# Patient Record
Sex: FEMALE | Race: WHITE | NOT HISPANIC OR LATINO | Employment: PART TIME | URBAN - METROPOLITAN AREA
[De-identification: names, ages, dates, MRNs, and addresses within clinical notes are randomized per-mention and may not be internally consistent; named-entity substitution may affect disease eponyms.]

---

## 2017-01-03 ENCOUNTER — OFFICE VISIT - HEALTHEAST (OUTPATIENT)
Dept: FAMILY MEDICINE | Facility: CLINIC | Age: 57
End: 2017-01-03

## 2017-01-03 DIAGNOSIS — H61.23 IMPACTED CERUMEN, BILATERAL: ICD-10-CM

## 2017-01-03 ASSESSMENT — MIFFLIN-ST. JEOR: SCORE: 1342.46

## 2017-02-26 ENCOUNTER — COMMUNICATION - HEALTHEAST (OUTPATIENT)
Dept: FAMILY MEDICINE | Facility: CLINIC | Age: 57
End: 2017-02-26

## 2017-02-26 DIAGNOSIS — F41.9 ANXIETY: ICD-10-CM

## 2017-03-20 ENCOUNTER — COMMUNICATION - HEALTHEAST (OUTPATIENT)
Dept: FAMILY MEDICINE | Facility: CLINIC | Age: 57
End: 2017-03-20

## 2017-05-27 ENCOUNTER — COMMUNICATION - HEALTHEAST (OUTPATIENT)
Dept: FAMILY MEDICINE | Facility: CLINIC | Age: 57
End: 2017-05-27

## 2017-05-27 DIAGNOSIS — F41.9 ANXIETY: ICD-10-CM

## 2017-06-28 ENCOUNTER — OFFICE VISIT - HEALTHEAST (OUTPATIENT)
Dept: FAMILY MEDICINE | Facility: CLINIC | Age: 57
End: 2017-06-28

## 2017-06-28 DIAGNOSIS — R05.9 COUGH: ICD-10-CM

## 2017-06-28 DIAGNOSIS — R50.9 FEVER: ICD-10-CM

## 2017-06-28 DIAGNOSIS — J18.9 COMMUNITY ACQUIRED PNEUMONIA: ICD-10-CM

## 2017-06-28 ASSESSMENT — MIFFLIN-ST. JEOR: SCORE: 1346.99

## 2017-08-27 ENCOUNTER — COMMUNICATION - HEALTHEAST (OUTPATIENT)
Dept: FAMILY MEDICINE | Facility: CLINIC | Age: 57
End: 2017-08-27

## 2017-08-27 DIAGNOSIS — F41.9 ANXIETY: ICD-10-CM

## 2017-11-20 ENCOUNTER — COMMUNICATION - HEALTHEAST (OUTPATIENT)
Dept: FAMILY MEDICINE | Facility: CLINIC | Age: 57
End: 2017-11-20

## 2017-11-20 DIAGNOSIS — F41.9 ANXIETY: ICD-10-CM

## 2017-11-22 ENCOUNTER — AMBULATORY - HEALTHEAST (OUTPATIENT)
Dept: FAMILY MEDICINE | Facility: CLINIC | Age: 57
End: 2017-11-22

## 2018-02-05 ENCOUNTER — OFFICE VISIT - HEALTHEAST (OUTPATIENT)
Dept: FAMILY MEDICINE | Facility: CLINIC | Age: 58
End: 2018-02-05

## 2018-02-05 DIAGNOSIS — Z12.31 ENCOUNTER FOR SCREENING MAMMOGRAM FOR BREAST CANCER: ICD-10-CM

## 2018-02-05 DIAGNOSIS — F41.9 ANXIETY: ICD-10-CM

## 2018-02-05 DIAGNOSIS — Z12.39 SCREENING BREAST EXAMINATION: ICD-10-CM

## 2018-02-05 DIAGNOSIS — Z12.31 VISIT FOR SCREENING MAMMOGRAM: ICD-10-CM

## 2018-02-05 DIAGNOSIS — Z00.00 HEALTHCARE MAINTENANCE: ICD-10-CM

## 2018-02-05 LAB
ALBUMIN SERPL-MCNC: 3.6 G/DL (ref 3.5–5)
ALP SERPL-CCNC: 68 U/L (ref 45–120)
ALT SERPL W P-5'-P-CCNC: 33 U/L (ref 0–45)
ANION GAP SERPL CALCULATED.3IONS-SCNC: 9 MMOL/L (ref 5–18)
AST SERPL W P-5'-P-CCNC: 23 U/L (ref 0–40)
BILIRUB SERPL-MCNC: 0.3 MG/DL (ref 0–1)
BUN SERPL-MCNC: 19 MG/DL (ref 8–22)
CALCIUM SERPL-MCNC: 9.1 MG/DL (ref 8.5–10.5)
CHLORIDE BLD-SCNC: 107 MMOL/L (ref 98–107)
CHOLEST SERPL-MCNC: 257 MG/DL
CO2 SERPL-SCNC: 25 MMOL/L (ref 22–31)
CREAT SERPL-MCNC: 0.66 MG/DL (ref 0.6–1.1)
FASTING STATUS PATIENT QL REPORTED: YES
GFR SERPL CREATININE-BSD FRML MDRD: >60 ML/MIN/1.73M2
GLUCOSE BLD-MCNC: 100 MG/DL (ref 70–125)
HDLC SERPL-MCNC: 50 MG/DL
LDLC SERPL CALC-MCNC: 155 MG/DL
POTASSIUM BLD-SCNC: 4.4 MMOL/L (ref 3.5–5)
PROT SERPL-MCNC: 6.8 G/DL (ref 6–8)
SODIUM SERPL-SCNC: 141 MMOL/L (ref 136–145)
TRIGL SERPL-MCNC: 262 MG/DL
TSH SERPL DL<=0.005 MIU/L-ACNC: 2.11 UIU/ML (ref 0.3–5)

## 2018-02-05 ASSESSMENT — MIFFLIN-ST. JEOR: SCORE: 1346.99

## 2018-02-06 ENCOUNTER — COMMUNICATION - HEALTHEAST (OUTPATIENT)
Dept: FAMILY MEDICINE | Facility: CLINIC | Age: 58
End: 2018-02-06

## 2018-02-15 ENCOUNTER — HOSPITAL ENCOUNTER (OUTPATIENT)
Dept: MAMMOGRAPHY | Facility: HOSPITAL | Age: 58
Discharge: HOME | End: 2018-02-15
Attending: FAMILY MEDICINE

## 2018-02-15 DIAGNOSIS — Z00.00 HEALTHCARE MAINTENANCE: ICD-10-CM

## 2018-02-15 DIAGNOSIS — Z12.31 VISIT FOR SCREENING MAMMOGRAM: ICD-10-CM

## 2018-02-15 DIAGNOSIS — Z12.39 SCREENING BREAST EXAMINATION: ICD-10-CM

## 2018-02-15 DIAGNOSIS — Z12.31 ENCOUNTER FOR SCREENING MAMMOGRAM FOR BREAST CANCER: ICD-10-CM

## 2018-05-07 ENCOUNTER — OFFICE VISIT - HEALTHEAST (OUTPATIENT)
Dept: FAMILY MEDICINE | Facility: CLINIC | Age: 58
End: 2018-05-07

## 2018-05-07 DIAGNOSIS — B07.8 OTHER VIRAL WARTS: ICD-10-CM

## 2018-05-07 ASSESSMENT — MIFFLIN-ST. JEOR: SCORE: 1351.53

## 2018-06-21 ENCOUNTER — COMMUNICATION - HEALTHEAST (OUTPATIENT)
Dept: FAMILY MEDICINE | Facility: CLINIC | Age: 58
End: 2018-06-21

## 2018-06-25 ENCOUNTER — COMMUNICATION - HEALTHEAST (OUTPATIENT)
Dept: FAMILY MEDICINE | Facility: CLINIC | Age: 58
End: 2018-06-25

## 2018-12-28 ENCOUNTER — COMMUNICATION - HEALTHEAST (OUTPATIENT)
Dept: FAMILY MEDICINE | Facility: CLINIC | Age: 58
End: 2018-12-28

## 2019-01-07 ENCOUNTER — COMMUNICATION - HEALTHEAST (OUTPATIENT)
Dept: FAMILY MEDICINE | Facility: CLINIC | Age: 59
End: 2019-01-07

## 2019-02-11 ENCOUNTER — OFFICE VISIT - HEALTHEAST (OUTPATIENT)
Dept: FAMILY MEDICINE | Facility: CLINIC | Age: 59
End: 2019-02-11

## 2019-02-11 DIAGNOSIS — Z11.59 ENCOUNTER FOR HEPATITIS C SCREENING TEST FOR LOW RISK PATIENT: ICD-10-CM

## 2019-02-11 DIAGNOSIS — Z12.31 ENCOUNTER FOR SCREENING MAMMOGRAM FOR BREAST CANCER: ICD-10-CM

## 2019-02-11 DIAGNOSIS — Z00.00 ROUTINE GENERAL MEDICAL EXAMINATION AT A HEALTH CARE FACILITY: ICD-10-CM

## 2019-02-11 DIAGNOSIS — F41.1 ANXIETY STATE: ICD-10-CM

## 2019-02-11 DIAGNOSIS — E78.5 DYSLIPIDEMIA: ICD-10-CM

## 2019-02-11 LAB
ALBUMIN SERPL-MCNC: 4 G/DL (ref 3.5–5)
ALP SERPL-CCNC: 71 U/L (ref 45–120)
ALT SERPL W P-5'-P-CCNC: 35 U/L (ref 0–45)
ANION GAP SERPL CALCULATED.3IONS-SCNC: 9 MMOL/L (ref 5–18)
AST SERPL W P-5'-P-CCNC: 23 U/L (ref 0–40)
BILIRUB SERPL-MCNC: 0.4 MG/DL (ref 0–1)
BUN SERPL-MCNC: 18 MG/DL (ref 8–22)
CALCIUM SERPL-MCNC: 10 MG/DL (ref 8.5–10.5)
CHLORIDE BLD-SCNC: 107 MMOL/L (ref 98–107)
CHOLEST SERPL-MCNC: 318 MG/DL
CO2 SERPL-SCNC: 26 MMOL/L (ref 22–31)
CREAT SERPL-MCNC: 0.72 MG/DL (ref 0.6–1.1)
FASTING STATUS PATIENT QL REPORTED: YES
GFR SERPL CREATININE-BSD FRML MDRD: >60 ML/MIN/1.73M2
GLUCOSE BLD-MCNC: 98 MG/DL (ref 70–125)
HDLC SERPL-MCNC: 58 MG/DL
LDLC SERPL CALC-MCNC: 209 MG/DL
POTASSIUM BLD-SCNC: 4.3 MMOL/L (ref 3.5–5)
PROT SERPL-MCNC: 7.4 G/DL (ref 6–8)
SODIUM SERPL-SCNC: 142 MMOL/L (ref 136–145)
TRIGL SERPL-MCNC: 257 MG/DL

## 2019-02-11 ASSESSMENT — MIFFLIN-ST. JEOR: SCORE: 1371.94

## 2019-02-12 ENCOUNTER — COMMUNICATION - HEALTHEAST (OUTPATIENT)
Dept: FAMILY MEDICINE | Facility: CLINIC | Age: 59
End: 2019-02-12

## 2019-02-12 LAB — HCV AB SERPL QL IA: NEGATIVE

## 2019-02-14 ENCOUNTER — COMMUNICATION - HEALTHEAST (OUTPATIENT)
Dept: FAMILY MEDICINE | Facility: CLINIC | Age: 59
End: 2019-02-14

## 2019-02-14 DIAGNOSIS — F41.9 ANXIETY: ICD-10-CM

## 2019-04-25 ENCOUNTER — HOSPITAL ENCOUNTER (OUTPATIENT)
Dept: MAMMOGRAPHY | Facility: CLINIC | Age: 59
Discharge: HOME | End: 2019-04-25
Attending: FAMILY MEDICINE

## 2019-04-25 DIAGNOSIS — Z12.31 ENCOUNTER FOR SCREENING MAMMOGRAM FOR BREAST CANCER: ICD-10-CM

## 2019-05-20 ENCOUNTER — HOSPITAL ENCOUNTER (OUTPATIENT)
Dept: MAMMOGRAPHY | Facility: CLINIC | Age: 59
Discharge: HOME | End: 2019-05-20
Attending: FAMILY MEDICINE

## 2019-05-20 DIAGNOSIS — R92.0 MICROCALCIFICATION OF RIGHT BREAST ON MAMMOGRAM: ICD-10-CM

## 2019-05-20 DIAGNOSIS — N64.89 BREAST ASYMMETRY: ICD-10-CM

## 2019-05-28 ENCOUNTER — COMMUNICATION - HEALTHEAST (OUTPATIENT)
Dept: FAMILY MEDICINE | Facility: CLINIC | Age: 59
End: 2019-05-28

## 2019-05-28 DIAGNOSIS — F41.1 ANXIETY STATE: ICD-10-CM

## 2019-05-29 ENCOUNTER — COMMUNICATION - HEALTHEAST (OUTPATIENT)
Dept: SCHEDULING | Facility: CLINIC | Age: 59
End: 2019-05-29

## 2019-05-29 DIAGNOSIS — F41.1 ANXIETY STATE: ICD-10-CM

## 2019-12-16 ENCOUNTER — COMMUNICATION - HEALTHEAST (OUTPATIENT)
Dept: SCHEDULING | Facility: CLINIC | Age: 59
End: 2019-12-16

## 2019-12-16 DIAGNOSIS — F41.1 ANXIETY STATE: ICD-10-CM

## 2020-02-01 ENCOUNTER — COMMUNICATION - HEALTHEAST (OUTPATIENT)
Dept: FAMILY MEDICINE | Facility: CLINIC | Age: 60
End: 2020-02-01

## 2020-02-01 DIAGNOSIS — F41.9 ANXIETY: ICD-10-CM

## 2020-02-10 ENCOUNTER — COMMUNICATION - HEALTHEAST (OUTPATIENT)
Dept: SCHEDULING | Facility: CLINIC | Age: 60
End: 2020-02-10

## 2020-02-10 DIAGNOSIS — F41.1 ANXIETY STATE: ICD-10-CM

## 2020-02-17 ENCOUNTER — OFFICE VISIT - HEALTHEAST (OUTPATIENT)
Dept: FAMILY MEDICINE | Facility: CLINIC | Age: 60
End: 2020-02-17

## 2020-02-17 DIAGNOSIS — Z00.00 HEALTHCARE MAINTENANCE: ICD-10-CM

## 2020-02-17 DIAGNOSIS — F41.9 ANXIETY: ICD-10-CM

## 2020-02-17 DIAGNOSIS — R92.1 BREAST CALCIFICATIONS: ICD-10-CM

## 2020-02-17 DIAGNOSIS — Z12.11 SCREEN FOR COLON CANCER: ICD-10-CM

## 2020-02-17 DIAGNOSIS — F41.1 ANXIETY STATE: ICD-10-CM

## 2020-02-17 DIAGNOSIS — Z11.4 ENCOUNTER FOR SCREENING FOR HIV: ICD-10-CM

## 2020-02-17 DIAGNOSIS — B00.1 COLD SORE: ICD-10-CM

## 2020-02-17 DIAGNOSIS — E78.5 DYSLIPIDEMIA: ICD-10-CM

## 2020-02-17 LAB
ALBUMIN SERPL-MCNC: 4.1 G/DL (ref 3.5–5)
ALP SERPL-CCNC: 71 U/L (ref 45–120)
ALT SERPL W P-5'-P-CCNC: 28 U/L (ref 0–45)
ANION GAP SERPL CALCULATED.3IONS-SCNC: 10 MMOL/L (ref 5–18)
AST SERPL W P-5'-P-CCNC: 20 U/L (ref 0–40)
BILIRUB SERPL-MCNC: 0.4 MG/DL (ref 0–1)
BUN SERPL-MCNC: 14 MG/DL (ref 8–22)
CALCIUM SERPL-MCNC: 10.1 MG/DL (ref 8.5–10.5)
CHLORIDE BLD-SCNC: 105 MMOL/L (ref 98–107)
CHOLEST SERPL-MCNC: 322 MG/DL
CO2 SERPL-SCNC: 25 MMOL/L (ref 22–31)
CREAT SERPL-MCNC: 0.75 MG/DL (ref 0.6–1.1)
FASTING STATUS PATIENT QL REPORTED: YES
GFR SERPL CREATININE-BSD FRML MDRD: >60 ML/MIN/1.73M2
GLUCOSE BLD-MCNC: 97 MG/DL (ref 70–125)
HDLC SERPL-MCNC: 53 MG/DL
HIV 1+2 AB+HIV1 P24 AG SERPL QL IA: NEGATIVE
LDLC SERPL CALC-MCNC: 195 MG/DL
POTASSIUM BLD-SCNC: 4.2 MMOL/L (ref 3.5–5)
PROT SERPL-MCNC: 7.2 G/DL (ref 6–8)
SODIUM SERPL-SCNC: 140 MMOL/L (ref 136–145)
TRIGL SERPL-MCNC: 370 MG/DL

## 2020-02-17 ASSESSMENT — MIFFLIN-ST. JEOR: SCORE: 1363.43

## 2020-02-18 ENCOUNTER — COMMUNICATION - HEALTHEAST (OUTPATIENT)
Dept: FAMILY MEDICINE | Facility: CLINIC | Age: 60
End: 2020-02-18

## 2020-02-18 ENCOUNTER — AMBULATORY - HEALTHEAST (OUTPATIENT)
Dept: FAMILY MEDICINE | Facility: CLINIC | Age: 60
End: 2020-02-18

## 2020-02-18 DIAGNOSIS — E78.5 DYSLIPIDEMIA: ICD-10-CM

## 2020-02-20 ENCOUNTER — COMMUNICATION - HEALTHEAST (OUTPATIENT)
Dept: FAMILY MEDICINE | Facility: CLINIC | Age: 60
End: 2020-02-20

## 2020-02-20 DIAGNOSIS — B00.1 HERPES LABIALIS: ICD-10-CM

## 2020-02-20 RX ORDER — PENCICLOVIR 10 MG/G
CREAM TOPICAL
Qty: 1.5 G | Refills: 3 | Status: SHIPPED | OUTPATIENT
Start: 2020-02-20 | End: 2022-03-21

## 2020-02-24 ENCOUNTER — COMMUNICATION - HEALTHEAST (OUTPATIENT)
Dept: FAMILY MEDICINE | Facility: CLINIC | Age: 60
End: 2020-02-24

## 2020-02-28 ENCOUNTER — HOSPITAL ENCOUNTER (OUTPATIENT)
Dept: MAMMOGRAPHY | Facility: CLINIC | Age: 60
Discharge: HOME | End: 2020-02-28
Attending: FAMILY MEDICINE

## 2020-02-28 DIAGNOSIS — R92.1 BREAST CALCIFICATIONS: ICD-10-CM

## 2020-11-18 ENCOUNTER — COMMUNICATION - HEALTHEAST (OUTPATIENT)
Dept: FAMILY MEDICINE | Facility: CLINIC | Age: 60
End: 2020-11-18

## 2020-11-18 DIAGNOSIS — F41.1 ANXIETY STATE: ICD-10-CM

## 2020-12-12 ENCOUNTER — RECORDS - HEALTHEAST (OUTPATIENT)
Dept: ADMINISTRATIVE | Facility: OTHER | Age: 60
End: 2020-12-12

## 2021-02-16 ENCOUNTER — COMMUNICATION - HEALTHEAST (OUTPATIENT)
Dept: FAMILY MEDICINE | Facility: CLINIC | Age: 61
End: 2021-02-16

## 2021-02-16 DIAGNOSIS — F41.9 ANXIETY: ICD-10-CM

## 2021-03-15 ENCOUNTER — COMMUNICATION - HEALTHEAST (OUTPATIENT)
Dept: TELEHEALTH | Facility: CLINIC | Age: 61
End: 2021-03-15

## 2021-03-15 ENCOUNTER — OFFICE VISIT - HEALTHEAST (OUTPATIENT)
Dept: FAMILY MEDICINE | Facility: CLINIC | Age: 61
End: 2021-03-15

## 2021-03-15 DIAGNOSIS — E78.5 DYSLIPIDEMIA: ICD-10-CM

## 2021-03-15 DIAGNOSIS — Z12.4 SCREENING FOR MALIGNANT NEOPLASM OF CERVIX: ICD-10-CM

## 2021-03-15 DIAGNOSIS — F41.1 ANXIETY STATE: ICD-10-CM

## 2021-03-15 DIAGNOSIS — Z00.00 HEALTHCARE MAINTENANCE: ICD-10-CM

## 2021-03-15 LAB
ALBUMIN SERPL-MCNC: 4.1 G/DL (ref 3.5–5)
ALP SERPL-CCNC: 72 U/L (ref 45–120)
ALT SERPL W P-5'-P-CCNC: 22 U/L (ref 0–45)
ANION GAP SERPL CALCULATED.3IONS-SCNC: 10 MMOL/L (ref 5–18)
AST SERPL W P-5'-P-CCNC: 18 U/L (ref 0–40)
BILIRUB SERPL-MCNC: 0.3 MG/DL (ref 0–1)
BUN SERPL-MCNC: 16 MG/DL (ref 8–22)
CALCIUM SERPL-MCNC: 9.4 MG/DL (ref 8.5–10.5)
CHLORIDE BLD-SCNC: 106 MMOL/L (ref 98–107)
CHOLEST SERPL-MCNC: 309 MG/DL
CO2 SERPL-SCNC: 26 MMOL/L (ref 22–31)
CREAT SERPL-MCNC: 0.73 MG/DL (ref 0.6–1.1)
FASTING STATUS PATIENT QL REPORTED: YES
GFR SERPL CREATININE-BSD FRML MDRD: >60 ML/MIN/1.73M2
GLUCOSE BLD-MCNC: 92 MG/DL (ref 70–125)
HDLC SERPL-MCNC: 48 MG/DL
LDLC SERPL CALC-MCNC: 205 MG/DL
POTASSIUM BLD-SCNC: 4.5 MMOL/L (ref 3.5–5)
PROT SERPL-MCNC: 7.1 G/DL (ref 6–8)
SODIUM SERPL-SCNC: 142 MMOL/L (ref 136–145)
TRIGL SERPL-MCNC: 278 MG/DL

## 2021-03-15 RX ORDER — GABAPENTIN 100 MG/1
100 CAPSULE ORAL 3 TIMES DAILY PRN
Qty: 30 CAPSULE | Refills: 0 | Status: SHIPPED | OUTPATIENT
Start: 2021-03-15 | End: 2022-03-21

## 2021-03-15 RX ORDER — ALPRAZOLAM 0.25 MG
TABLET ORAL
Qty: 30 TABLET | Refills: 0 | Status: SHIPPED | OUTPATIENT
Start: 2021-03-15 | End: 2021-12-20

## 2021-03-15 ASSESSMENT — MIFFLIN-ST. JEOR: SCORE: 1360.03

## 2021-03-16 ENCOUNTER — COMMUNICATION - HEALTHEAST (OUTPATIENT)
Dept: FAMILY MEDICINE | Facility: CLINIC | Age: 61
End: 2021-03-16

## 2021-03-16 LAB
HPV SOURCE: NORMAL
HUMAN PAPILLOMA VIRUS 16 DNA: NEGATIVE
HUMAN PAPILLOMA VIRUS 18 DNA: NEGATIVE
HUMAN PAPILLOMA VIRUS FINAL DIAGNOSIS: NORMAL
HUMAN PAPILLOMA VIRUS OTHER HR: NEGATIVE
SPECIMEN DESCRIPTION: NORMAL

## 2021-03-22 LAB
BKR LAB AP ABNORMAL BLEEDING: NO
BKR LAB AP BIRTH CONTROL/HORMONES: NORMAL
BKR LAB AP CERVICAL APPEARANCE: NORMAL
BKR LAB AP GYN ADEQUACY: NORMAL
BKR LAB AP GYN INTERPRETATION: NORMAL
BKR LAB AP HPV REFLEX: NORMAL
BKR LAB AP LMP: NORMAL
BKR LAB AP PATIENT STATUS: NORMAL
BKR LAB AP PREVIOUS ABNORMAL: NORMAL
BKR LAB AP PREVIOUS NORMAL: 2016
HIGH RISK?: NO
PATH REPORT.COMMENTS IMP SPEC: NORMAL
RESULT FLAG (HE HISTORICAL CONVERSION): NORMAL

## 2021-03-23 ENCOUNTER — COMMUNICATION - HEALTHEAST (OUTPATIENT)
Dept: FAMILY MEDICINE | Facility: CLINIC | Age: 61
End: 2021-03-23

## 2021-04-28 ENCOUNTER — COMMUNICATION - HEALTHEAST (OUTPATIENT)
Dept: FAMILY MEDICINE | Facility: CLINIC | Age: 61
End: 2021-04-28

## 2021-04-28 DIAGNOSIS — F41.9 ANXIETY: ICD-10-CM

## 2021-04-30 RX ORDER — PAROXETINE 10 MG/1
TABLET, FILM COATED ORAL
Qty: 90 TABLET | Refills: 3 | Status: SHIPPED | OUTPATIENT
Start: 2021-04-30 | End: 2022-01-24

## 2021-05-11 ENCOUNTER — COMMUNICATION - HEALTHEAST (OUTPATIENT)
Dept: FAMILY MEDICINE | Facility: CLINIC | Age: 61
End: 2021-05-11

## 2021-05-24 ENCOUNTER — RECORDS - HEALTHEAST (OUTPATIENT)
Dept: ADMINISTRATIVE | Facility: CLINIC | Age: 61
End: 2021-05-24

## 2021-05-25 ENCOUNTER — RECORDS - HEALTHEAST (OUTPATIENT)
Dept: ADMINISTRATIVE | Facility: CLINIC | Age: 61
End: 2021-05-25

## 2021-05-29 NOTE — TELEPHONE ENCOUNTER
Reason contacted:  Medication problem  Information relayed:  Below message   Additional questions:  No  Further follow-up needed:  No  Okay to leave a detailed message:  No

## 2021-05-29 NOTE — TELEPHONE ENCOUNTER
Controlled Substance Refill Request  Medication:   Requested Prescriptions     Pending Prescriptions Disp Refills     ALPRAZolam (XANAX) 0.25 MG tablet [Pharmacy Med Name: ALPRAZOLAM 0.25MG TABLETS] 30 tablet 0     Sig: TAKE 1 TABLET BY MOUTH EVERY 8 HOURS AS NEEDED     Date Last Fill: 11/22/17  Pharmacy: [id number]   Submit electronically to pharmacy  Controlled Substance Agreement on File:   Encounter-Level CSA Scan Date:    There are no encounter-level csa scan date.       Last office visit: Last office visit pertaining to requested medication was 2/11/19.

## 2021-05-29 NOTE — TELEPHONE ENCOUNTER
Who is calling:  Patient  Reason for Call:  States she is out of medication.  Please review.   Date of last appointment with primary care: NA  Okay to leave a detailed message: No

## 2021-05-29 NOTE — TELEPHONE ENCOUNTER
Patient called and states that she went to go  her prescription for alprazolam and it was only filled for #10 tablets by Dr. Curtis. Patient is confused by this as Dr. Zimmerman usually prescribes it for #30.     Patient would like Dr. Zimmerman to review and send in rest of prescription.     Reason for Disposition    Caller has NON-URGENT medication question about med that PCP prescribed and triager unable to answer question    Protocols used: MEDICATION QUESTION CALL-A-AH

## 2021-05-30 ENCOUNTER — RECORDS - HEALTHEAST (OUTPATIENT)
Dept: ADMINISTRATIVE | Facility: CLINIC | Age: 61
End: 2021-05-30

## 2021-05-30 VITALS — BODY MASS INDEX: 28.16 KG/M2 | HEIGHT: 65 IN | WEIGHT: 169 LBS

## 2021-05-31 VITALS — HEIGHT: 65 IN | BODY MASS INDEX: 28.32 KG/M2 | WEIGHT: 170 LBS

## 2021-06-01 VITALS — HEIGHT: 65 IN | WEIGHT: 171 LBS | BODY MASS INDEX: 28.49 KG/M2

## 2021-06-01 VITALS — WEIGHT: 170 LBS | HEIGHT: 65 IN | BODY MASS INDEX: 28.32 KG/M2

## 2021-06-02 VITALS — BODY MASS INDEX: 27.64 KG/M2 | WEIGHT: 172 LBS | HEIGHT: 66 IN

## 2021-06-04 VITALS
HEART RATE: 83 BPM | BODY MASS INDEX: 27.48 KG/M2 | WEIGHT: 171 LBS | HEIGHT: 66 IN | SYSTOLIC BLOOD PRESSURE: 110 MMHG | DIASTOLIC BLOOD PRESSURE: 72 MMHG | OXYGEN SATURATION: 96 %

## 2021-06-04 NOTE — TELEPHONE ENCOUNTER
Controlled Substance Refill Request  Medication:   Requested Prescriptions     Pending Prescriptions Disp Refills     ALPRAZolam (XANAX) 0.25 MG tablet [Pharmacy Med Name: ALPRAZOLAM 0.25MG TABLETS] 30 tablet 0     Sig: TAKE 1 TABLET BY MOUTH EVERY 8 HOURS AS NEEDED     Date Last Fill: 5/30/19  Pharmacy: [id number]   Submit electronically to pharmacy  Controlled Substance Agreement on File:   Encounter-Level CSA Scan Date:    There are no encounter-level csa scan date.       Last office visit: Last office visit pertaining to requested medication was 2/11/19.

## 2021-06-05 VITALS
WEIGHT: 172 LBS | DIASTOLIC BLOOD PRESSURE: 70 MMHG | HEART RATE: 76 BPM | HEIGHT: 65 IN | BODY MASS INDEX: 28.66 KG/M2 | SYSTOLIC BLOOD PRESSURE: 100 MMHG | OXYGEN SATURATION: 97 %

## 2021-06-05 NOTE — TELEPHONE ENCOUNTER
Refill Approved    Rx renewed per Medication Renewal Policy. Medication was last renewed on 2/17/19    Enedina Miller, [facility] Connection Triage/Med Refill 2/2/2020     Requested Prescriptions   Pending Prescriptions Disp Refills     PARoxetine (PAXIL) 10 MG tablet [Pharmacy Med Name: PAROXETINE 10MG TABLETS] 90 tablet 3     Sig: TAKE 1 TABLET(10 MG) BY MOUTH EVERY MORNING       SSRI Refill Protocol  Passed - 2/1/2020  8:30 PM        Passed - PCP or prescribing provider visit in last year     Last office visit with prescriber/PCP: 5/7/2018 Tianna Zimmerman MD OR same dept: Visit date not found OR same specialty: 5/7/2018 Tianna Zimmerman MD  Last physical: 2/11/2019 Last MTM visit: Visit date not found   Next visit within 3 mo: Visit date not found  Next physical within 3 mo: Visit date not found  Prescriber OR PCP: Tianna Zimmerman MD  Last diagnosis associated with med order: 1. Anxiety  - PARoxetine (PAXIL) 10 MG tablet [Pharmacy Med Name: PAROXETINE 10MG TABLETS]; TAKE 1 TABLET(10 MG) BY MOUTH EVERY MORNING  Dispense: 90 tablet; Refill: 3    If protocol passes may refill for 12 months if within 3 months of last provider visit (or a total of 15 months).

## 2021-06-06 NOTE — TELEPHONE ENCOUNTER
Controlled Substance Refill Request  Medication Name:   Requested Prescriptions     Pending Prescriptions Disp Refills     ALPRAZolam (XANAX) 0.25 MG tablet [Pharmacy Med Name: ALPRAZOLAM 0.25MG TABLETS] 30 tablet 0     Sig: TAKE 1 TABLET BY MOUTH EVERY 8 HOURS AS NEEDED     Date Last Fill:   ALPRAZolam (XANAX) 0.25 MG tablet 30 tablet 0 12/17/2019  No   Sig: TAKE 1 TABLET BY MOUTH EVERY 8 HOURS AS NEEDED   Sent to pharmacy as: ALPRAZolam 0.25 mg tablet (XANAX)   E-Prescribing Status: Receipt confirmed by pharmacy (12/17/2019  9:03 AM CST)   [facility]ed [facility]  Submit electronically to pharmacy  Controlled Substance Agreement on file:   Encounter-Level CSA Scan Date:    There are no encounter-level csa scan date.        Last office visit:  2/11/19

## 2021-06-06 NOTE — TELEPHONE ENCOUNTER
----- Message from Tianna Zimmerman MD sent at 2/18/2020  7:09 AM CST -----  Your cholesterol is so high it is important we start a statin medication.  I am happy to discuss this with you if you would like a telephone call but this is a medication that you take once nightly in order to lower your overall cholesterol. It has been called in for you.

## 2021-06-06 NOTE — TELEPHONE ENCOUNTER
Called patient to discuss the statin. Left message.    The ASCVD Risk score (Lowellrashmi NEGRON Jr., et al., 2013) failed to calculate for the following reasons:    The valid total cholesterol range is 130 to 320 mg/dL

## 2021-06-06 NOTE — TELEPHONE ENCOUNTER
Medication Request    Medication name: Denavir    Requested Pharmacy: [id number] [address] AT [place] [address]     Reason for request: Herpes    When did you use medication last?:  NA    Patient offered appointment:  patient declined     Okay to leave a detailed message: yes    Please send script to patients pharmacy and inform the patient of the outcome to this request.

## 2021-06-06 NOTE — TELEPHONE ENCOUNTER
Question following Office Visit  When did you see your provider: 2/17/20  What is your question: Patient stated she wanted Tianna Zimmerman MD to call her back to discuss with her more on the red yeast rice VS a statin medication.       FYI:  Patient stated to let Tianna Zimmerman MD know that the Denovir worked great for her cold sore and her cold sore is almost gone.    Patient also stated no one has called her to set up the mammogram yet. Patient was given imaging scheduling phone number and was transferred to them as well.  Okay to leave a detailed message: No [phone]

## 2021-06-06 NOTE — TELEPHONE ENCOUNTER
Returned patient call.     Patient is willing to trial atorvastatin but also picked up red rice yeast. She might consider it in the future.

## 2021-06-06 NOTE — TELEPHONE ENCOUNTER
Patient Returning Call    Reason for call:  tcb    Information relayed to patient:      Patient informed of lab findings and the need to start med's per PCP.    She states she was on a statin and didn't like the side effects.    States her daughter does research and statin are just deteriorates the bones.      Patient has additional questions:  Yes  If YES, what are your questions/concerns:  Treatment options    Okay to leave a detailed message?: Yes    Please call patient to discuss a plan of care.

## 2021-06-06 NOTE — TELEPHONE ENCOUNTER
Pt notified and she states she used to be on Atorvastatin but it was giving her muscle aches, so you said she could d/c it. Can you contact pt to discuss alternative plan?

## 2021-06-06 NOTE — TELEPHONE ENCOUNTER
Please send to the pharmacy if appropriate  I do not see this has been prescribed for her in the past.

## 2021-06-06 NOTE — PROGRESS NOTES
Assessment/Plan:     Patient presents today for routine physical examination.    Healthcare Maintenance: USPSTF recommendations for age 60:  Patient has been counseled on/screened for:  - intimate partner violence and there are no concerns at this time  - a healthful diet and physical activity for CVD disease prevention  - Diabetes and hyperlipidemia: screening was performed.   - Hep C, negative in 2018  - Asprin use: patient chose to start  Sexually transmitted infections: Patient would not like to be screened for chlamydia, gonorrhea, syphilis, HIV, and hepatitis; HIV ok  Colorectal Cancer: Colonoscopy last performed in 2010; ordered today  Immunizations: up to date except for zoster and influenza which was recommended  Cervical Cancer Screening: patient has been screened for cervical cancer per protocol in 2016, results were negative with negative HPV, due in 2021  Breast Cancer: Last mammogram 5/19, but recommended right diagnostic mammogram 6 months after last image      Additional concerns are as detailed below:    1. Breast calcifications  Strongly recommended patient proceed with the diagnostic mammogram previously recommended.   - Mammo Diagnostic Right; Future    2. Anxiety  Refilled:  - PARoxetine (PAXIL) 10 MG tablet; TAKE 1 TABLET(10 MG) BY MOUTH EVERY MORNING  Dispense: 90 tablet; Refill: 3  - recommended hydroxyzine instead of xanax as a first line agent; ok to use xanax sparingly as a 2nd line    3. Cold sore  - valACYclovir (VALTREX) 1000 MG tablet; Take 2 tablets (2,000 mg total) by mouth 2 (two) times a day for 1 day.  Dispense: 4 tablet; Refill: 1      AVS printed and given to patient.  Return to clinic in 1 year.    I have had an Advance Directives discussion with the patient.    This note has been dictated using voice recognition software. Any grammatical or context distortions are unintentional and inherent to the the software.     Tianna Zimmerman MD  [facility]  "Clinic    Subjective:     Monica Camacho is a 60 y.o. female who presents to clinic for routine physical.    Additional concerns include:    1.  Patient has no concerns.  She states her anxiety is well controlled with Paxil.  She takes the Xanax very sparingly.     Diet: well balanced diet  Physical Activity: The patient maintains a regular, healthy exercise program.    PHQ-2 Score:  0    Health Care Directive: not on file but handout provided    Patient Care Team:  PCP: Tianna Zimmerman     Past Medical History, Family History, and Social History reviewed.     Review of systems is as stated in HPI.  Patient endorses: none  The remainder of the 10 system review is otherwise negative.    Objective:     /72   Pulse 83   Ht 5' 5.75\" (1.67 m)   Wt 171 lb (77.6 kg)   LMP 07/15/2015   SpO2 96%   BMI 27.81 kg/m    Gen: Alert, NAD, appears stated age, normal hygiene   Eyes: conjunctivae without injection, sclera clear, EOMI  ENT/mouth: nares clear, septum midline, absent rhinorrhea,absent pharyngeal injection, neck is supple, no thyroid enlargement  CV: RRR, no murmur appreciated, pedal edema absent bilaterally  Resp: CTAB, no wheezes, rales or ronchi  ABD: normoactive, non-tender to palpation, nondistended  MSK: grossly full range of motion in all joints, no obvious deformity  Neuro: CN II-XII grossly intact, no deficits in coordination  Psych: no apparent hallucinations or delusions, no pressured speech; alert, oriented x3  SKIN: dry and without lesions except for a cold sore on the bottom right lip  Heme/lymph: no pallor, no active bleeding/bruising, no adenopathy appreciated  :  - external genitalia: normal appearance, no lesions, no flattening of the anatomic structures  - urethral meatus: without prolapse, no obvious irritation  - vagina: expected resilience given estrogen status, both cystocele and rectocele present but mild  - cervix: no cervical motion tenderness  - uterus: anteverted  - anus and " perineum: normal and without lesions  Breast:  - absent nipple discharge, no masses appreciated, nontender to palpation      Medications:  Current Outpatient Medications   Medication Sig     ALPRAZolam (XANAX) 0.25 MG tablet TAKE 1 TABLET BY MOUTH EVERY 8 HOURS AS NEEDED     calcium-vitamin D (CALCIUM-VITAMIN D) 500 mg(1,250mg) -200 unit per tablet Take 1 tablet by mouth 2 (two) times a day with meals.     cholecalciferol, vitamin D3, (VITAMIN D3) 2,000 unit cap Take by mouth.     multivitamin capsule Take 1 capsule by mouth daily.     PARoxetine (PAXIL) 10 MG tablet TAKE 1 TABLET(10 MG) BY MOUTH EVERY MORNING     aspirin 81 MG EC tablet Take 1 tablet (81 mg total) by mouth daily.     hydrOXYzine HCl (ATARAX) 25 MG tablet Take 0.5-2 tablets (12.5-50 mg total) by mouth 3 (three) times a day as needed for anxiety.     valACYclovir (VALTREX) 1000 MG tablet Take 2 tablets (2,000 mg total) by mouth 2 (two) times a day for 1 day.       Allergies:  Allergies   Allergen Reactions     Amoxicillin      Penicillins        PMH:  Past Medical History:   Diagnosis Date     Anxiety      Cold sore        PSH:  Past Surgical History:   Procedure Laterality Date     BREAST BIOPSY Right 1990    benign      SECTION  1985       Family Hx:  Family History   Problem Relation Age of Onset     Colon cancer Mother 58     Hyperlipidemia Mother      Hyperlipidemia Father      Hypertension Father      Cancer Father         skin cancer, not melanoma     Breast cancer Other         Mat Cousin     Breast cancer Maternal Grandmother 74     No Medical Problems Paternal Grandmother      No Medical Problems Paternal Grandfather      No Medical Problems Maternal Aunt      No Medical Problems Paternal Aunt      BRCA 1/2 Neg Hx      Endometrial cancer Neg Hx      Ovarian cancer Neg Hx        Social History:  Social History     Socioeconomic History     Marital status:      Spouse name: Not on file     Number of children: Not on file      Years of education: Not on file     Highest education level: Not on file   Occupational History     Not on file   Social Needs     Financial resource strain: Not on file     Food insecurity:     Worry: Not on file     Inability: Not on file     Transportation needs:     Medical: Not on file     Non-medical: Not on file   Tobacco Use     Smoking status: Never Smoker     Smokeless tobacco: Never Used   Substance and Sexual Activity     Alcohol use: Yes     Alcohol/week: 1.0 standard drinks     Types: 1 Glasses of wine per week     Drug use: No     Sexual activity: Yes     Partners: Male     Birth control/protection: Post-menopausal   Lifestyle     Physical activity:     Days per week: Not on file     Minutes per session: Not on file     Stress: Not on file   Relationships     Social connections:     Talks on phone: Not on file     Gets together: Not on file     Attends Yarsanism service: Not on file     Active member of club or organization: Not on file     Attends meetings of clubs or organizations: Not on file     Relationship status: Not on file     Intimate partner violence:     Fear of current or ex partner: Not on file     Emotionally abused: Not on file     Physically abused: Not on file     Forced sexual activity: Not on file   Other Topics Concern     Not on file   Social History Narrative     Not on file       LDL Calculated (mg/dL)   Date Value   02/11/2019 209 (H)   02/05/2018 155 (H)   10/12/2015 175 (H)        Immunization History   Administered Date(s) Administered     Tdap 07/22/2013     There are no preventive care reminders to display for this patient.

## 2021-06-06 NOTE — TELEPHONE ENCOUNTER
Called patient to discuss the importance of statins.  After extensive discussion, patient will trial the atorvastatin.  If she develops leg cramps she may try red rice yeast.  We discussed that no studies exist on red rice yeast efficacy as compared to statins, but patient is still interested in trying.  She will also consider Metamucil daily to help lower her cholesterol.  Also happy to call in the herpes medication.     The ASCVD Risk score (Laurarashmi NEGRON Jr., et al., 2013) failed to calculate for the following reasons:    The valid total cholesterol range is 130 to 320 mg/dL

## 2021-06-08 NOTE — PROGRESS NOTES
Assessment/Plan:     1. Impacted cerumen, bilateral  Removed with lavage and finished with curette.  Patient noticed red resolution of symptoms.  Aftercare discussed.  Call return to care if symptoms worsen or do not improve    Recommend following up with primary care physician for annual visit and maintenance of chronic conditions        Subjective:      Monica Camacho is a 56 y.o. female comes in today for cerumen impaction.  She states that she has noticed it for about a month.  She has had it before.  She states she primarily feels it on the left side but slightly on the right.  It feels as though it is muffled.  No significant pain no recent illnesses.  She otherwise feels okay without any new concerns.  No problems with any of her medications.  Has no other new concerns today    Current Outpatient Prescriptions   Medication Sig Dispense Refill     ALPRAZolam (XANAX) 0.25 MG tablet TAKE 1 TABLET BY MOUTH EVERY 8 HOURS AS NEEDED 60 tablet 0     calcium-vitamin D (CALCIUM-VITAMIN D) 500 mg(1,250mg) -200 unit per tablet Take 1 tablet by mouth 2 (two) times a day with meals.       cholecalciferol, vitamin D3, (VITAMIN D3) 2,000 unit cap Take by mouth.       multivitamin capsule Take 1 capsule by mouth daily.       PARoxetine (PAXIL) 10 MG tablet TAKE 1 TABLET BY MOUTH EVERY DAY 90 tablet 3     ALPRAZolam (XANAX) 0.25 MG tablet TAKE 1 TABLET BY MOUTH EVERY 8 HOURS AS NEEDED 60 tablet 0     No current facility-administered medications for this visit.        Past Medical History, Family History, and Social History reviewed.  Past Medical History   Diagnosis Date     Anxiety      Past Surgical History   Procedure Laterality Date     Breast biopsy Right      benign      section       Amoxicillin and Penicillins  Family History   Problem Relation Age of Onset     Colon cancer Mother 58     Hyperlipidemia Mother      Hyperlipidemia Father      Hypertension Father      Breast cancer Other      Mat Cousin  "    Breast cancer Maternal Grandmother 74     No Medical Problems Paternal Grandmother      No Medical Problems Paternal Grandfather      No Medical Problems Maternal Aunt      No Medical Problems Paternal Aunt      BRCA 1/2 Neg Hx      Cancer Neg Hx      Endometrial cancer Neg Hx      Ovarian cancer Neg Hx      Social History     Social History     Marital status:      Spouse name: N/A     Number of children: N/A     Years of education: N/A     Occupational History     Not on file.     Social History Main Topics     Smoking status: Never Smoker     Smokeless tobacco: Never Used     Alcohol use 3.0 oz/week     5 Glasses of wine per week     Drug use: No     Sexual activity: Yes     Partners: Male     Birth control/ protection: Other     Other Topics Concern     Not on file     Social History Narrative         Review of systems is as stated in HPI, and the remainder of the 10 system review is otherwise negative.    Objective:     Vitals:    01/03/17 1559   BP: 112/74   Patient Site: Right Arm   Patient Position: Sitting   Cuff Size: Adult Regular   Pulse: 76   Weight: 169 lb (76.7 kg)   Height: 5' 5\" (1.651 m)    Body mass index is 28.12 kg/(m^2).    General Appearance:    Alert, cooperative, no distress, appears stated age   Head:    Normocephalic, without obvious abnormality, atraumatic   Eyes:    PERRL, EOM's intact   Ears:   bilateral cerumen impaction removed showing normal TM's and external ear canals   Nose:   Mucosa normal, no drainage     or sinus tenderness   Throat:   Oropharynx is clear   Neck:   Supple, symmetrical, no adenopathy, no thyromegally       Lungs:     Clear to auscultation bilaterally, respirations unlabored        Heart:    Regular rate and rhythm, S1 and S2 normal, no murmur, rub    or gallop                   Extremities:   Extremities normal, atraumatic, no cyanosis or edema       Skin:   No rashes or lesions         This note has been dictated using voice recognition software. Any " grammatical or context distortions are unintentional and inherent to the the software.

## 2021-06-11 NOTE — PROGRESS NOTES
Assessment/Plan:     1. Cough  - XR Chest PA and Lateral    2. Fever  - XR Chest PA and Lateral    3. Community acquired pneumonia  Images showing only mild and minimal changes, however in context of symptoms we discussed appropriate treatments.  Will treat with azithromycin for 5 day course.  Discussed common side effects and appropriate use.  Follow-up with persistence of symptoms, worsening, or onset of additional symptoms.        Subjective:      Monica Camacho is a 57 y.o. female presented to clinic today for evaluation of illness.  About 4 or 5 days ago developed a fever as high as 102 associated with chills, generalized body aches, and pressure in her head that worsens with cough.  Over the last couple days she has developed chest congestion associated with some slight pressure and expiratory wheeze.  Cough is intermittently productive of yellow to green mucus.  No blood.  Intermittent mild anterior central chest pain overlying the sternal region.  Finds that when she lays down at night she is getting some drainage in the back of her throat that causes a tickle that makes her cough.  She denies shortness of breath when laying down.  Denies nausea, vomiting, or diarrhea.  Coworker apparently was diagnosed with pneumonia and had similar symptoms, patient states that this coworker was very close to her and speaking and spit while talking, patient is concerned that she contracted the illness there.    Current Outpatient Prescriptions   Medication Sig Dispense Refill     ALPRAZolam (XANAX) 0.25 MG tablet TAKE 1 TABLET BY MOUTH EVERY 8 HOURS AS NEEDED 30 tablet 0     calcium-vitamin D (CALCIUM-VITAMIN D) 500 mg(1,250mg) -200 unit per tablet Take 1 tablet by mouth 2 (two) times a day with meals.       cholecalciferol, vitamin D3, (VITAMIN D3) 2,000 unit cap Take by mouth.       multivitamin capsule Take 1 capsule by mouth daily.       PARoxetine (PAXIL) 10 MG tablet TAKE ONE TABLET BY MOUTH EVERY DAY 90 tablet 0  "    azithromycin (ZITHROMAX) 250 MG tablet Take 2 tabs by mouth on day one, then one tab by mouth daily days two through five 6 tablet 0     No current facility-administered medications for this visit.        Past Medical History, Family History, and Social History reviewed.  Past Medical History:   Diagnosis Date     Anxiety      Past Surgical History:   Procedure Laterality Date     BREAST BIOPSY Right 1990    benign      SECTION  1985     Amoxicillin and Penicillins  Family History   Problem Relation Age of Onset     Colon cancer Mother 58     Hyperlipidemia Mother      Hyperlipidemia Father      Hypertension Father      Breast cancer Other      Mat Cousin     Breast cancer Maternal Grandmother 74     No Medical Problems Paternal Grandmother      No Medical Problems Paternal Grandfather      No Medical Problems Maternal Aunt      No Medical Problems Paternal Aunt      BRCA 1/2 Neg Hx      Cancer Neg Hx      Endometrial cancer Neg Hx      Ovarian cancer Neg Hx      Social History     Social History     Marital status:      Spouse name: N/A     Number of children: N/A     Years of education: N/A     Occupational History     Not on file.     Social History Main Topics     Smoking status: Never Smoker     Smokeless tobacco: Never Used     Alcohol use 3.0 oz/week     5 Glasses of wine per week     Drug use: No     Sexual activity: Yes     Partners: Male     Birth control/ protection: Other     Other Topics Concern     Not on file     Social History Narrative         Review of systems is as stated in HPI, and the remainder of the 10 system review is otherwise negative.    Objective:     Vitals:    17 1609   BP: 120/78   Patient Site: Right Arm   Patient Position: Sitting   Cuff Size: Adult Regular   Pulse: 82   Resp: 20   Temp: 98.5  F (36.9  C)   TempSrc: Oral   SpO2: 97%   Weight: 170 lb (77.1 kg)   Height: 5' 5\" (1.651 m)    Body mass index is 28.29 kg/(m^2).    Alert female.  Mucous members " moist.  Tympanic membrane spelling translucent.  Oropharynx clear.  Neck supple without lymphadenopathy.  Heart with regular rate and rhythm.  With laying exam, she coughs with each expiration, bronchitis type cough.  No focal crackles, rhonchi, and no audible wheezes.  Extremities without edema, cyanosis.    I ordered and personally reviewed a 2 view chest x-ray that shows some mild haziness in the right lung base on the PA view, suggestive of mild infiltrate on the lateral view as well.      This note has been dictated using voice recognition software. Any grammatical or context distortions are unintentional and inherent to the the software.

## 2021-06-15 NOTE — PROGRESS NOTES
Assessment/Plan:     Patient presents today for routine physical examination.    Healthcare Maintenance: USPSTF recommendations for age 61;  Patient has been counseled on/screened for:  - intimate partner violence and there are no concerns at this time  - a healthful diet and physical activity for CVD prevention  - Diabetes and hyperlipidemia: screening was performed.   - Hep C, negative in 2019  - Asprin use: patient chose to consider it  Sexually transmitted infections: Patient would not like to be screened for chlamydia, gonorrhea, syphilis, HIV, and hepatitis  Colorectal Cancer: Colonoscopy due in 2025  Immunizations: up to date except for influenza and shingles which was recommended  Cervical Cancer Screening: patient has been screened for cervical cancer per protocol in 2016, results were negative with negative HPV, due in 2021, ordered today  Mammogram: ordered today      Additional concerns are as detailed below:    1. Anxiety  Patient did not tolerate the hydroxyzine, recommended trial of gabapentin although I did also refill her alprazolam.  Controlled substance contract also completed.  - gabapentin (NEURONTIN) 100 MG capsule; Take 100 mg by mouth 3 (three) times a day as needed.  Dispense: 30 capsule; Refill: 0  - ALPRAZolam (XANAX) 0.25 MG tablet; TAKE 1 TABLET BY MOUTH EVERY 8 HOURS AS NEEDED  Dispense: 30 tablet; Refill: 0    2. Dyslipidemia  Again discussed ASCVD risks with patient, cannot even calculate her score secondary to elevated cholesterol from last year.  We will repeat cholesterol testing today and can revisit this once the results are back.  - Lipid [place] FASTING        AVS printed and given to patient.  Return to clinic in 1 year.    I have had an Advance Directives discussion with the patient.    This note has been dictated using voice recognition software. Any grammatical or context distortions are unintentional and inherent to the the software.     Tianna Zimmerman MD  Family  "Medicine [facility]    Subjective:     Monica Camacho is a 61 y.o. female who presents to clinic for routine physical.    Additional concerns include:    1.  Patient continues to have some mild intermittent anxiety with fear of vomiting.  She has been to therapy in the past and takes very low dose, very sparing use of Xanax.  We have had a long discussion in the past about the dangers of benzodiazepines.  Patient did not tolerate the hydroxyzine.    PHQ-2 Score:  0    Health Care Directive: discussed    Patient Care Team:   PCP: Tianna Zimmerman     Past Medical History, Family History, and Social History reviewed.     Review of systems:  Patient endorses: none  The remainder of the 10 system review is otherwise negative.    Objective:     /70   Pulse 76   Ht 5' 5.25\" (1.657 m)   Wt 172 lb (78 kg)   LMP 07/15/2015   SpO2 97%   BMI 28.40 kg/m    Gen: Alert, NAD, appears stated age, normal hygiene   Eyes: conjunctivae without injection, sclera clear, EOMI  ENT/mouth: nares clear, septum midline, absent rhinorrhea,absent pharyngeal injection, neck is supple, no thyroid enlargement  CV: RRR, no murmur appreciated, pedal edema absent bilaterally  Resp: CTAB, no wheezes, rales or ronchi  ABD: normoactive, non-tender to palpation, nondistended  MSK: grossly full range of motion in all joints, no obvious deformity  Neuro: CN II-XII grossly intact, no deficits in coordination  Psych: no apparent hallucinations or delusions, no pressured speech; alert, oriented x3  SKIN: dry and without lesions, right 4th digit on lower extremity with damaged toenail and granulation tissue  Heme/lymph: no pallor, no active bleeding/bruising, no adenopathy appreciated  :  - external genitalia: normal appearance, no lesions, no flattening of the anatomic structures  - urethral meatus: without prolapse, no obvious irritation  - vagina: expected resilience given estrogen status, no cystocele or rectocele, normal discharge  - " cervix: normal, no lesions, no discharge  - uterus: anteverted  - anus and perineum: normal and without lesions  Breast:  - absent nipple discharge, no masses appreciated, nontender to palpation      Medications:  Current Outpatient Medications   Medication Sig     ALPRAZolam (XANAX) 0.25 MG tablet TAKE 1 TABLET BY MOUTH EVERY 8 HOURS AS NEEDED     calcium-vitamin D (CALCIUM-VITAMIN D) 500 mg(1,250mg) -200 unit per tablet Take 1 tablet by mouth 2 (two) times a day with meals.     cholecalciferol, vitamin D3, (VITAMIN D3) 2,000 unit cap Take by mouth.     multivitamin capsule Take 1 capsule by mouth daily.     PARoxetine (PAXIL) 10 MG tablet TAKE 1 TABLET(10 MG) BY MOUTH EVERY MORNING     penciclovir (DENAVIR) 1 % cream Apply twice daily to the lesion     aspirin 81 MG EC tablet Take 1 tablet (81 mg total) by mouth daily.     gabapentin (NEURONTIN) 100 MG capsule Take 100 mg by mouth 3 (three) times a day as needed.       Allergies:  Allergies   Allergen Reactions     Amoxicillin      Penicillins        PMH:  Past Medical History:   Diagnosis Date     Anxiety      Cold sore        PSH:  Past Surgical History:   Procedure Laterality Date     BREAST BIOPSY Right 1990    benign      SECTION  1985       Family Hx:  Family History   Problem Relation Age of Onset     Colon cancer Mother 58     Hyperlipidemia Mother      Hyperlipidemia Father      Hypertension Father      Cancer Father         skin cancer, not melanoma     Breast cancer Other         Mat Cousin     Breast cancer Maternal Grandmother 74     No Medical Problems Paternal Grandmother      No Medical Problems Paternal Grandfather      No Medical Problems Maternal Aunt      No Medical Problems Paternal Aunt      BRCA 1/2 Neg Hx      Endometrial cancer Neg Hx      Ovarian cancer Neg Hx        Social History:  Social History     Socioeconomic History     Marital status:      Spouse name: Not on file     Number of children: Not on file     Years of  education: Not on file     Highest education level: Not on file   Occupational History     Not on file   Social Needs     Financial resource strain: Not on file     Food insecurity     Worry: Not on file     Inability: Not on file     Transportation needs     Medical: Not on file     Non-medical: Not on file   Tobacco Use     Smoking status: Never Smoker     Smokeless tobacco: Never Used   Substance and Sexual Activity     Alcohol use: Yes     Alcohol/week: 1.0 standard drinks     Types: 1 Glasses of wine per week     Drug use: No     Sexual activity: Yes     Partners: Male     Birth control/protection: Post-menopausal   Lifestyle     Physical activity     Days per week: Not on file     Minutes per session: Not on file     Stress: Not on file   Relationships     Social connections     Talks on phone: Not on file     Gets together: Not on file     Attends [employer] service: Not on file     Active member of club or organization: Not on file     Attends meetings of clubs or organizations: Not on file     Relationship status: Not on file     Intimate partner violence     Fear of current or ex partner: Not on file     Emotionally abused: Not on file     Physically abused: Not on file     Forced sexual activity: Not on file   Other Topics Concern     Not on file   Social History Narrative     Not on file       LDL Calculated (mg/dL)   Date Value   02/17/2020 195 (H)   02/11/2019 209 (H)   02/05/2018 155 (H)        Immunization History   Administered Date(s) Administered     Tdap 07/22/2013     There are no preventive care reminders to display for this patient.

## 2021-06-15 NOTE — PROGRESS NOTES
Assessment/Plan:     Patient presents today for routine physical and establishing care with a new physician.    We discussed patient's well-controlled depression and anxiety, refilled the Paxil, no changes made in medication today.  Handout given to patient about the dangers of benzodiazepine use, patient is using very sparingly.    Patient declines influenza vaccination today, had a discussion about at risk populations and patient may consider in the future.    Patient is likely perimenopausal and is supplementing with calcium and vitamin D.  She is not having any mood symptoms and no sexual dysfunction.    For healthcare maintenance we will order a CMP, TSH, and lipid panel.  Previously patient was placed on a statin but experienced significant myalgias.  She would prefer not replaced on one again.      Problem List Items Addressed This Visit     None      Visit Diagnoses     Healthcare maintenance    -  Primary    Relevant Orders    Lipid [place] FASTING    Comprehensive Metabolic Panel    Thyroid Stimulating Hormone (TSH)    Mammo Screening Bilateral    Anxiety        Relevant Medications    PARoxetine (PAXIL) 10 MG tablet    Screening breast examination        Relevant Orders    Mammo Screening Bilateral    Encounter for screening mammogram for breast cancer        Relevant Orders    Mammo Screening Bilateral    Visit for screening mammogram        Relevant Orders    Mammo Screening Bilateral          AVS printed and given to patient.  Return to clinic in 1 year.    The following are part of a depression follow up plan for the patient:  mental health care assessment and mental health care management  The following high BMI interventions were performed this visit: encouragement to exercise    Total time spent with patient was 40 minutes with greater than 50% spent in face-to-face counseling regarding the above plan.    This note has been dictated using voice recognition software. Any grammatical or context  "distortions are unintentional and inherent to the the software.     Tianna Zimmerman MD  Family Medicine [facility]      Subjective:      Monica Camacho is a 58 y.o. female who presents to clinic for physical.    Patient has no concerns today.  She has a previous history of anxiety for which he takes Paxil and the very occasional 0.25 mg Xanax.  The Xanax was refilled at the end of November 2017, for 10 tablets, and patient has not used any of them since that time.  She has weaned herself down to her lowest tolerable dose, and is doing well.    For weight loss, patient is body pump and core conditioning.  She is constantly working to lose weight.    Patient feels she might be menopausal as her last menstruation was in June 2017.  Her mom is of a similar age.  Prior to that she had menorrhagia but this is subsequently resolved.  She has no sexual symptoms, endorses some hot flashes originally but these have subsequently resolved, no increased irritability.    Her last Pap was in 2016 and normal, next one is due in 2021.    Patient declines a flu shot today.    Past Medical History, Family History, and Social History reviewed.     Review of systems is as stated in HPI.  The remainder of the 10 system review is otherwise negative.    Objective:     /70  Pulse 63  Ht 5' 5\" (1.651 m)  Wt 170 lb (77.1 kg)  LMP 07/15/2015  SpO2 98%  BMI 28.29 kg/m2 Body mass index is 28.29 kg/(m^2).    Gen: Alert, NAD, appears stated age, normal hygiene   Eyes: conjunctivae without injection, sclera clear, EOMI  ENT/mouth: nares clear, septum midline, absent rhinorrhea, throat without injection, neck is supple, no thyroid enlargement  CV: RRR, no murmur appreciated, pedal edema absent bilaterally  Resp: CTAB, no wheezes, rales or ronchi  Breast: normal but dense breasts bilaterally without masses appreciated or nipple discharge  ABD: normoactive, non-tender to palpation, nondistended  MSK: grossly full range of motion in " all joints, no obvious deformity  Neuro: CN II-XII grossly intact, no deficits in coordination  Psych: no apparent hallucinations or delusions, no pressured speech; alert, oriented x3  SKIN: dry and without lesions  Heme/lymph: no pallor, no active bleeding/bruising, no adenopathy appreciated      Current Outpatient Prescriptions on File Prior to Visit   Medication Sig Dispense Refill     ALPRAZolam (XANAX) 0.25 MG tablet TAKE 1 TABLET BY MOUTH EVERY 8 HOURS AS NEEDED 10 tablet 0     calcium-vitamin D (CALCIUM-VITAMIN D) 500 mg(1,250mg) -200 unit per tablet Take 1 tablet by mouth 2 (two) times a day with meals.       cholecalciferol, vitamin D3, (VITAMIN D3) 2,000 unit cap Take by mouth.       multivitamin capsule Take 1 capsule by mouth daily.       [DISCONTINUED] PARoxetine (PAXIL) 10 MG tablet Take 1 tablet (10 mg total) by mouth every morning. No further refills until seen for a physical; call 24/7 90 tablet 0     [DISCONTINUED] azithromycin (ZITHROMAX) 250 MG tablet Take 2 tabs by mouth on day one, then one tab by mouth daily days two through five 6 tablet 0     No current facility-administered medications on file prior to visit.

## 2021-06-15 NOTE — TELEPHONE ENCOUNTER
RN cannot approve Refill Request    RN can NOT refill this medication PCP messaged that patient is overdue for Office Visit and Protocol failed and NO refill given. Last office visit: 5/7/2018 Tianna Zimmerman MD Last Physical: 2/17/2020 Last MTM visit: Visit date not found Last visit same specialty: 5/7/2018 Tianna Zimmerman MD.  Next visit within 3 mo: Visit date not found  Next physical within 3 mo: Visit date not found      Kathi Victoria, [facility] Triage/Med Refill 2/16/2021    Requested Prescriptions   Pending Prescriptions Disp Refills     PARoxetine (PAXIL) 10 MG tablet 90 tablet 3     Sig: TAKE 1 TABLET(10 MG) BY MOUTH EVERY MORNING       [facility] Refill Protocol  Failed - 2/16/2021 10:18 AM        Failed - PCP or prescribing provider visit in last year     Last office visit with prescriber/PCP: 5/7/2018 Tianna Zimmerman MD OR same dept: Visit date not found OR same specialty: 5/7/2018 Tianna Zimmerman MD  Last physical: 2/17/2020 Last MTM visit: Visit date not found   Next visit within 3 mo: Visit date not found  Next physical within 3 mo: Visit date not found  Prescriber OR PCP: Tianna Zimmerman MD  Last diagnosis associated with med order: 1. Anxiety  - PARoxetine (PAXIL) 10 MG tablet; TAKE 1 TABLET(10 MG) BY MOUTH EVERY MORNING  Dispense: 90 tablet; Refill: 3    If protocol passes may refill for 12 months if within 3 months of last provider visit (or a total of 15 months).

## 2021-06-17 NOTE — PROGRESS NOTES
"Subjective:  Patient has a history of a plantar wart when she was in high school but no works at that time.  She noticed a wart on the ulnar aspect of the right palm several months ago to may be years ago, she is unsure.  It did not bother her until she noticed 2 new lesions on the lateral aspect of 2 separate fingers on the same right hand.    Objective:  /71  Pulse 79  Ht 5' 5\" (1.651 m)  Wt 171 lb (77.6 kg)  LMP 07/15/2015  SpO2 97%  BMI 28.46 kg/m2  Gen: NAD  Skin: 1 cm x 0.5 cm verrucous lesion present on the ulnar aspect of the palmar surface of the right hand, 3 smaller lesions 2 clustered together on fingers of the right hand    Assessment: Verrucous lesions, patient desirous of removal  Plan:  -Cryotherapy ×3 or 4 times per lesion  -Patient tolerated procedure well  -Recommend treatment at home in 1 week and then return in 1 week for follow-up    Tianna Zimmerman MD  Family Medicine [facility]    "

## 2021-06-17 NOTE — TELEPHONE ENCOUNTER
Reason contacted:  Medication question  Information relayed:    Patient is calling to see if she can use Ashwagandha supplement in addition to her alprazolam and paroxetine?     She states this supplement is supposed to help with brain health.     Please advise   Additional questions:  No  Further follow-up needed:  Yes  Okay to leave a detailed message:  Yes [phone]

## 2021-06-17 NOTE — TELEPHONE ENCOUNTER
If she is taking the alprazolam regularly, I would avoid the ashwaganda -- some evidence shows that it may increase the sedative effects. In general, there is not a ton of evidence to support use of ashwaganda therefore I would be cautious. In addition, OTC supplements are not FDA regulated so I would be careful about what product she is buying.     Here is the info from [employer] regarding Ashwaganda and memory:     Cognitive function. Although there has been interest in using oral ashwagandha for cognitive function, there is insufficient reliable information about the clinical effects of ashwagandha for this condition.

## 2021-06-17 NOTE — TELEPHONE ENCOUNTER
Refill Approved    Rx renewed per Medication Renewal Policy. Medication was last renewed on 2/18/21.    Ahmet Barber, [facility] Triage/Med Refill 4/30/2021     Requested Prescriptions   Pending Prescriptions Disp Refills     PARoxetine (PAXIL) 10 MG tablet 30 tablet 1     Sig: TAKE 1 TABLET(10 MG) BY MOUTH EVERY MORNING       [facility] Refill Protocol  Passed - 4/28/2021  2:36 PM        Passed - PCP or prescribing provider visit in last year     Last office visit with prescriber/PCP: 5/7/2018 Tianna Zimmerman MD OR same dept: Visit date not found OR same specialty: 5/7/2018 Tianna Zimmerman MD  Last physical: 3/15/2021 Last MTM visit: Visit date not found   Next visit within 3 mo: Visit date not found  Next physical within 3 mo: Visit date not found  Prescriber OR PCP: Tianna Zimmerman MD  Last diagnosis associated with med order: 1. Anxiety  - PARoxetine (PAXIL) 10 MG tablet; TAKE 1 TABLET(10 MG) BY MOUTH EVERY MORNING  Dispense: 30 tablet; Refill: 1    If protocol passes may refill for 12 months if within 3 months of last provider visit (or a total of 15 months).

## 2021-06-18 NOTE — LETTER
Letter by Tianna Zimmerman MD at      Author: Tianna Zimmerman MD Service: -- Author Type: --    Filed:  Encounter Date: 2/12/2019 Status: (Other)       Monica Camacho  [address] [address]             February 12, 2019         Dear Ms. Camacho,    Below are the results from your recent visit:    Resulted Orders   Hepatitis C Antibody (Anti-HCV)   Result Value Ref Range    Hepatitis C Ab Negative Negative   Lipid [place]   Result Value Ref Range    Cholesterol 318 (H) <=199 mg/dL    Triglycerides 257 (H) <=149 mg/dL    HDL Cholesterol 58 >=50 mg/dL    LDL Calculated 209 (H) <=129 mg/dL    Patient Fasting > 8hrs? Yes    Comprehensive Metabolic Panel   Result Value Ref Range    Sodium 142 136 - 145 mmol/L    Potassium 4.3 3.5 - 5.0 mmol/L    Chloride 107 98 - 107 mmol/L    CO2 26 22 - 31 mmol/L    Anion Gap, Calculation 9 5 - 18 mmol/L    Glucose 98 70 - 125 mg/dL    BUN 18 8 - 22 mg/dL    Creatinine 0.72 0.60 - 1.10 mg/dL    GFR MDRD Af Amer >60 >60 mL/min/1.73m2    GFR MDRD Non Af Amer >60 >60 mL/min/1.73m2    Bilirubin, Total 0.4 0.0 - 1.0 mg/dL    Calcium 10.0 8.5 - 10.5 mg/dL    Protein, Total 7.4 6.0 - 8.0 g/dL    Albumin 4.0 3.5 - 5.0 g/dL    Alkaline Phosphatase 71 45 - 120 U/L    AST 23 0 - 40 U/L    ALT 35 0 - 45 U/L    Narrative    Fasting Glucose reference range is 70-99 mg/dL per  American Diabetes Association (ADA) guidelines.       The 10-year ASCVD risk score (Laurarashmi NEGRON Jr., et al., 2013) is: 3.2%    Values used to calculate the score:      Age: 59 years      Sex: Female      Is Non- : No      Diabetic: No      Tobacco smoker: No      Systolic Blood Pressure: 120 mmHg      Is BP treated: No      HDL Cholesterol: 58 mg/dL      Total Cholesterol: 318 mg/dL    Although your cholesterol is significantly elevated, there is no indication for us to start a medication to treat this at this time.  However, I do think that diet and exercise modifications are  essential in order to lower this number.  Otherwise, your lab work looks excellent.    Please call with questions or contact us using [employer].    Sincerely,        Electronically signed by Tianna Zimmerman MD

## 2021-06-21 NOTE — LETTER
Letter by Dona Simental at      Author: Dona Simental Service: -- Author Type: --    Filed:  Date of Service:  Status: (Other)         Tianna Zimmerman  [address] [place]  [address]  [facility] [address]            October 16, 2020      Exam Date: 2/28/20    Dear Tianna Zimmerman:  In accordance with Mammography Quality Standards Act of 1992 (MQSA), we are informing you that the below named patient has been mailed two separate notices for a Mammogram in 6 months.    We feel we have given Monica Janice 1960 an adequate amount of time to respond.  As part of the MQSA, this will be our final attempt to contact the patient.    Please notify us, either by phone [phone] or fax [phone], if the patient has gone to another facility for her follow-up study.    Thank you for any assistance you can provide.        Sincerely,    Your Care Team

## 2021-06-21 NOTE — LETTER
Letter by Leeanna Waite RN at      Author: Leeanna Waite RN Service: -- Author Type: --    Filed:  Encounter Date: 3/23/2021 Status: (Other)         Monica Camacho  [address] [address]             March 23, 2021         Dear Ms. Camacho,    We are happy to inform you that your recent Pap smear and Human Papillomavirus (HPV) test results are normal and negative.    It is recommended that you have your next Pap smear and Human Papillomavirus (HPV) test in 5 years. You will also need to return to the clinic every year for an annual wellness visit.    If you have additional questions regarding this result, please contact our office and we will be happy to assist you.      Sincerely,    Your [facility] Care Team

## 2021-06-21 NOTE — LETTER
Letter by Tianna Zimmerman MD at      Author: Tianna Zimmerman MD Service: -- Author Type: --    Filed:  Encounter Date: 3/16/2021 Status: (Other)         Monica Camacho  [address] [address]             March 16, 2021         Dear Ms. Camacho,    Below are the results from your recent visit:    Resulted Orders   Lipid [place] FASTING   Result Value Ref Range    Cholesterol 309 (H) <=199 mg/dL    Triglycerides 278 (H) <=149 mg/dL    HDL Cholesterol 48 (L) >=50 mg/dL    LDL Calculated 205 (H) <=129 mg/dL    Patient Fasting > 8hrs? Yes    Comprehensive Metabolic Panel   Result Value Ref Range    Sodium 142 136 - 145 mmol/L    Potassium 4.5 3.5 - 5.0 mmol/L    Chloride 106 98 - 107 mmol/L    CO2 26 22 - 31 mmol/L    Anion Gap, Calculation 10 5 - 18 mmol/L    Glucose 92 70 - 125 mg/dL    BUN 16 8 - 22 mg/dL    Creatinine 0.73 0.60 - 1.10 mg/dL    GFR MDRD Af Amer >60 >60 mL/min/1.73m2    GFR MDRD Non Af Amer >60 >60 mL/min/1.73m2    Bilirubin, Total 0.3 0.0 - 1.0 mg/dL    Calcium 9.4 8.5 - 10.5 mg/dL    Protein, Total 7.1 6.0 - 8.0 g/dL    Albumin 4.1 3.5 - 5.0 g/dL    Alkaline Phosphatase 72 45 - 120 U/L    AST 18 0 - 40 U/L    ALT 22 0 - 45 U/L    Narrative    Fasting Glucose reference range is 70-99 mg/dL per  American Diabetes Association (ADA) guidelines.       Your cholesterol is very, very high.  However, your risk of stroke and heart attack is still in calculated as being 2.9% in the next 10 years.  At this time I do not think we need to make any medication changes, but I would seriously try to decrease the cholesterol in your diet.    Please call with questions or contact us using RUN.    Sincerely,        Electronically signed by Tianna Zimmerman MD

## 2021-06-22 NOTE — TELEPHONE ENCOUNTER
Medication Request  Medication name: Patient currently takes 100 mg magnesium in her MVI/Mulitmineral supplement.  Is it ok with her medication regimen to add magnesium supplement 400 mg with the 100 mg daily?  Is it ok with her prescribed medication.    Pharmacy Name and Location: [facility]  Reason for request: Please advise.   When did you use medication last?:  Has not started yet.  Okay to leave a detailed message: yes  [phone] Call early leaves for work 2:30pm

## 2021-06-22 NOTE — TELEPHONE ENCOUNTER
Reason contacted:  Medication question  Information relayed:  Notified of routing comment from Dr. Zimmerman.   Additional questions:  No  Further follow-up needed:  No  Okay to leave a detailed message:  No

## 2021-06-23 NOTE — PROGRESS NOTES
Assessment/Plan:     Patient presents today for routine physical examination.    Healthcare Maintenance: USPSTF recommendations for age 59;  Patient has been counseled on/screened for:  - intimate partner violence and there are no concerns at this time  - a healthful diet and physical activity for CVD disease prevention  - Diabetes and hyperlipidemia: screening was performed.   - Hep C, negative in ordered today  - Asprin use: patient chose to start  - history of smoking, patient is not eligible for the low dose chest CT today  Sexually transmitted infections: Patient would not like to be screened for chlamydia, gonorrhea, syphilis, HIV, and hepatitis  Colorectal Cancer: Colonoscopy last performed 2010; it was normal  Immunizations: up to date except for influenza which was recommended but declined  Cervical Cancer Screening: patient has been screened for cervical cancer per protocol in 2016, results were negative with negative HPV, next due in 2021  Breast Cancer: Last mammogram 2018, ordered today      Additional concerns are as detailed below:    1. Anxiety  No changes made to medication, patient continues to use Xanax very sparingly.  She usually has 2 prescriptions filled in a 6-month period.  Changes made to Paxil today.    Return to clinic in 1 year.     I have had an Advance Directives discussion with the patient.    Total time spent with patient was 45 minutes with greater than 50% spent in face-to-face counseling regarding the above plan.    This note has been dictated using voice recognition software. Any grammatical or context distortions are unintentional and inherent to the the software.     Tianna Zimmerman MD  Family Medicine [facility]    Subjective:     Monica Camacho is a 59 y.o. female who presents to clinic for routine physical.    Additional concerns include:    1. Patient desires a skin check.     Diet: well balanced diet  Physical Activity: The patient maintains a regular, healthy  "exercise program.    PHQ-2 Score:  0    Health Care Directive: not on file but handout provided    Patient Care Team:   PCP: Tianna Zimmerman     Past Medical History, Family History, and Social History reviewed.     Review of systems is as stated in HPI.  Patient endorses: none  The remainder of the 10 system review is otherwise negative.    Objective:     /60 (Patient Site: Right Arm, Patient Position: Sitting, Cuff Size: Adult Regular)   Pulse 88   Ht 5' 6\" (1.676 m)   Wt 172 lb (78 kg)   LMP 07/15/2015   SpO2 96%   BMI 27.76 kg/m    Gen: Alert, NAD, appears stated age, normal hygiene   Eyes: conjunctivae without injection, sclera clear, EOMI  ENT/mouth: nares clear, septum midline, absent rhinorrhea,absent pharyngeal injection, neck is supple, no thyroid enlargement  CV: RRR, no murmur appreciated, pedal edema absent bilaterally  Resp: CTAB, no wheezes, rales or ronchi  ABD: normoactive, non-tender to palpation, nondistended  MSK: grossly full range of motion in all joints, no obvious deformity  Neuro: CN II-XII grossly intact, no deficits in coordination  Psych: no apparent hallucinations or delusions, no pressured speech; alert, oriented x3  SKIN: Seborrheic keratoses, moles, and cherry hemangiomas predominate, no concerning lesions  Heme/lymph: no pallor, no active bleeding/bruising, no adenopathy appreciated  Breast: Nontender, no nipple discharge, no masses appreciated      Medications:  Current Outpatient Medications   Medication Sig     ALPRAZolam (XANAX) 0.25 MG tablet TAKE 1 TABLET BY MOUTH EVERY 8 HOURS AS NEEDED     calcium-vitamin D (CALCIUM-VITAMIN D) 500 mg(1,250mg) -200 unit per tablet Take 1 tablet by mouth 2 (two) times a day with meals.     cholecalciferol, vitamin D3, (VITAMIN D3) 2,000 unit cap Take by mouth.     multivitamin capsule Take 1 capsule by mouth daily.     PARoxetine (PAXIL) 10 MG tablet Take 1 tablet (10 mg total) by mouth every morning.     aspirin 81 MG EC tablet " Take 1 tablet (81 mg total) by mouth daily.     magnesium oxide (MAGOX) 400 mg (241.3 mg magnesium) tablet Take 1 tablet (400 mg total) by mouth daily.       Allergies:  Allergies   Allergen Reactions     Amoxicillin      Penicillins        PMH:  Past Medical History:   Diagnosis Date     Anxiety        PSH:  Past Surgical History:   Procedure Laterality Date     BREAST BIOPSY Right 1990    benign      SECTION  1985       Family Hx:  Family History   Problem Relation Age of Onset     Colon cancer Mother 58     Hyperlipidemia Mother      Hyperlipidemia Father      Hypertension Father      Cancer Father         skin cancer, not melanoma     Breast cancer Other         Mat Cousin     Breast cancer Maternal Grandmother 74     No Medical Problems Paternal Grandmother      No Medical Problems Paternal Grandfather      No Medical Problems Maternal Aunt      No Medical Problems Paternal Aunt      BRCA 1/2 Neg Hx      Endometrial cancer Neg Hx      Ovarian cancer Neg Hx        Social History:  Social History     Socioeconomic History     Marital status:      Spouse name: Not on file     Number of children: Not on file     Years of education: Not on file     Highest education level: Not on file   Social Needs     Financial resource strain: Not on file     Food insecurity - worry: Not on file     Food insecurity - inability: Not on file     Transportation needs - medical: Not on file     Transportation needs - non-medical: Not on file   Occupational History     Not on file   Tobacco Use     Smoking status: Never Smoker     Smokeless tobacco: Never Used   Substance and Sexual Activity     Alcohol use: Yes     Alcohol/week: 0.0 oz     Drug use: No     Sexual activity: Yes     Partners: Male     Birth control/protection: Other   Other Topics Concern     Not on file   Social History Narrative     Not on file       LDL Calculated (mg/dL)   Date Value   2018 155 (H)   10/12/2015 175 (H)   2014       Comment:     Invalid, Triglycerides >300        Immunization History   Administered Date(s) Administered     Tdap 07/22/2013     There are no preventive care reminders to display for this patient.

## 2021-06-24 NOTE — TELEPHONE ENCOUNTER
Refill Approved    Rx renewed per Medication Renewal Policy. Medication was last renewed on 2/5/18.    Rosaura Dick, [facility] Triage/Med Refill 2/17/2019     Requested Prescriptions   Pending Prescriptions Disp Refills     PARoxetine (PAXIL) 10 MG tablet [Pharmacy Med Name: PAROXETINE 10MG TABLETS] 90 tablet 0     Sig: TAKE 1 TABLET(10 MG) BY MOUTH EVERY MORNING    [facility] Refill Protocol  Passed - 2/14/2019  3:13 AM       Passed - PCP or prescribing provider visit in last year    Last office visit with prescriber/PCP: 5/7/2018 Tianna Zimmerman MD OR same dept: 5/7/2018 Tianna Zimmerman MD OR same specialty: 5/7/2018 Tianna Zimmerman MD  Last physical: 2/11/2019 Last MTM visit: Visit date not found   Next visit within 3 mo: Visit date not found  Next physical within 3 mo: Visit date not found  Prescriber OR PCP: Tianna Zimmerman MD  Last diagnosis associated with med order: 1. Anxiety  - PARoxetine (PAXIL) 10 MG tablet [Pharmacy Med Name: PAROXETINE 10MG TABLETS]; TAKE 1 TABLET(10 MG) BY MOUTH EVERY MORNING  Dispense: 90 tablet; Refill: 0    If protocol passes may refill for 12 months if within 3 months of last provider visit (or a total of 15 months).

## 2021-06-27 ENCOUNTER — HEALTH MAINTENANCE LETTER (OUTPATIENT)
Age: 61
End: 2021-06-27

## 2021-07-13 ENCOUNTER — RECORDS - HEALTHEAST (OUTPATIENT)
Dept: ADMINISTRATIVE | Facility: CLINIC | Age: 61
End: 2021-07-13

## 2021-07-21 ENCOUNTER — RECORDS - HEALTHEAST (OUTPATIENT)
Dept: ADMINISTRATIVE | Facility: CLINIC | Age: 61
End: 2021-07-21

## 2021-07-22 ENCOUNTER — RECORDS - HEALTHEAST (OUTPATIENT)
Dept: FAMILY MEDICINE | Facility: CLINIC | Age: 61
End: 2021-07-22

## 2021-07-22 DIAGNOSIS — Z12.31 OTHER SCREENING MAMMOGRAM: ICD-10-CM

## 2021-10-17 ENCOUNTER — HEALTH MAINTENANCE LETTER (OUTPATIENT)
Age: 61
End: 2021-10-17

## 2021-12-20 DIAGNOSIS — R11.0 NAUSEA: Primary | ICD-10-CM

## 2021-12-20 DIAGNOSIS — F41.1 ANXIETY STATE: ICD-10-CM

## 2021-12-20 RX ORDER — ONDANSETRON 4 MG/1
4 TABLET, ORALLY DISINTEGRATING ORAL EVERY 12 HOURS PRN
Qty: 30 TABLET | Refills: 0 | OUTPATIENT
Start: 2021-12-20

## 2021-12-20 RX ORDER — ALPRAZOLAM 0.25 MG
TABLET ORAL
Qty: 10 TABLET | Refills: 0 | Status: SHIPPED | OUTPATIENT
Start: 2021-12-20 | End: 2022-03-21

## 2021-12-20 NOTE — TELEPHONE ENCOUNTER
Last OV: 03/15/2021  Next OV: N/A  Last CSA: 03/15/2021    Pending Prescriptions:                       Disp   Refills    ALPRAZolam (XANAX) 0.25 MG tablet         30 tab*0            Sig: TAKE 1 TABLET BY MOUTH EVERY 8 HOURS AS NEEDED.    ondansetron (ZOFRAN-ODT) 4 MG ODT tab     30 tab*0            Sig: Take 1 tablet (4 mg) by mouth every 12 hours as           needed for nausea

## 2021-12-21 ENCOUNTER — TELEPHONE (OUTPATIENT)
Dept: FAMILY MEDICINE | Facility: CLINIC | Age: 61
End: 2021-12-21
Payer: COMMERCIAL

## 2021-12-21 RX ORDER — ONDANSETRON 4 MG/1
TABLET, FILM COATED ORAL EVERY 8 HOURS PRN
COMMUNITY
End: 2022-03-21

## 2021-12-21 RX ORDER — ONDANSETRON 4 MG/1
4 TABLET, FILM COATED ORAL EVERY 12 HOURS PRN
Qty: 30 TABLET | Refills: 1 | Status: SHIPPED | OUTPATIENT
Start: 2021-12-21 | End: 2022-03-21

## 2021-12-21 RX ORDER — ONDANSETRON 4 MG/1
4 TABLET, FILM COATED ORAL EVERY 8 HOURS PRN
COMMUNITY
End: 2021-12-21

## 2021-12-21 NOTE — TELEPHONE ENCOUNTER
Patient checking status of Zofran request, Alprazolam was received by the pharmacy but not the Zofran.        Pending Prescriptions:                       Disp   Refills    ALPRAZolam (XANAX) 0.25 MG tablet         30 tab*0            Sig: TAKE 1 TABLET BY MOUTH EVERY 8 HOURS AS NEEDED.    ondansetron (ZOFRAN-ODT) 4 MG ODT tab     30 tab*0            Sig: Take 1 tablet (4 mg) by mouth every 12 hours as           needed for nausea

## 2022-01-23 DIAGNOSIS — F41.9 ANXIETY: ICD-10-CM

## 2022-01-24 RX ORDER — PAROXETINE 10 MG/1
TABLET, FILM COATED ORAL
Qty: 90 TABLET | Refills: 3 | Status: SHIPPED | OUTPATIENT
Start: 2022-01-24 | End: 2022-03-21

## 2022-02-28 ENCOUNTER — TELEPHONE (OUTPATIENT)
Dept: FAMILY MEDICINE | Facility: CLINIC | Age: 62
End: 2022-02-28
Payer: COMMERCIAL

## 2022-02-28 NOTE — TELEPHONE ENCOUNTER
Reason for Call:  1) appointment request 2) hair loss    Detailed comments:     Patient is requesting to be seen by  before [facility]. Patient is on a wait list. Please call her if you are able to squeeze her in.     Patient also reports that she has noticed her hair falling out and thinning after COVID. She would like recommendations on how to treat and prevent hair loss.     Phone Number Patient can be reached at: Home number on file [phone] (home)    Best Time: any    Can we leave a detailed message on this number? YES    Call taken on 2/28/2022 at 10:46 AM by Netta Crawford

## 2022-03-21 ENCOUNTER — OFFICE VISIT (OUTPATIENT)
Dept: FAMILY MEDICINE | Facility: CLINIC | Age: 62
End: 2022-03-21
Payer: COMMERCIAL

## 2022-03-21 VITALS
OXYGEN SATURATION: 98 % | DIASTOLIC BLOOD PRESSURE: 82 MMHG | HEIGHT: 65 IN | WEIGHT: 171 LBS | HEART RATE: 90 BPM | SYSTOLIC BLOOD PRESSURE: 132 MMHG | BODY MASS INDEX: 28.49 KG/M2

## 2022-03-21 DIAGNOSIS — Z00.00 HEALTHCARE MAINTENANCE: ICD-10-CM

## 2022-03-21 DIAGNOSIS — Z86.16 HISTORY OF COVID-19: ICD-10-CM

## 2022-03-21 DIAGNOSIS — Z12.31 VISIT FOR SCREENING MAMMOGRAM: ICD-10-CM

## 2022-03-21 DIAGNOSIS — F41.1 ANXIETY STATE: Primary | ICD-10-CM

## 2022-03-21 DIAGNOSIS — L65.9 HAIR LOSS: ICD-10-CM

## 2022-03-21 DIAGNOSIS — B07.8 OTHER VIRAL WARTS: ICD-10-CM

## 2022-03-21 LAB
ALBUMIN SERPL-MCNC: 3.9 G/DL (ref 3.5–5)
ALP SERPL-CCNC: 71 U/L (ref 45–120)
ALT SERPL W P-5'-P-CCNC: 24 U/L (ref 0–45)
ANION GAP SERPL CALCULATED.3IONS-SCNC: 13 MMOL/L (ref 5–18)
AST SERPL W P-5'-P-CCNC: 19 U/L (ref 0–40)
BILIRUB SERPL-MCNC: 0.3 MG/DL (ref 0–1)
BUN SERPL-MCNC: 18 MG/DL (ref 8–22)
CALCIUM SERPL-MCNC: 9.6 MG/DL (ref 8.5–10.5)
CHLORIDE BLD-SCNC: 104 MMOL/L (ref 98–107)
CHOLEST SERPL-MCNC: 318 MG/DL
CO2 SERPL-SCNC: 22 MMOL/L (ref 22–31)
CREAT SERPL-MCNC: 0.72 MG/DL (ref 0.6–1.1)
FASTING STATUS PATIENT QL REPORTED: YES
GFR SERPL CREATININE-BSD FRML MDRD: >90 ML/MIN/1.73M2
GLUCOSE BLD-MCNC: 98 MG/DL (ref 70–125)
HDLC SERPL-MCNC: 52 MG/DL
LDLC SERPL CALC-MCNC: 216 MG/DL
POTASSIUM BLD-SCNC: 4.1 MMOL/L (ref 3.5–5)
PROT SERPL-MCNC: 7.2 G/DL (ref 6–8)
SODIUM SERPL-SCNC: 139 MMOL/L (ref 136–145)
TRIGL SERPL-MCNC: 248 MG/DL
TSH SERPL DL<=0.005 MIU/L-ACNC: 1.63 UIU/ML (ref 0.3–5)

## 2022-03-21 PROCEDURE — 80061 LIPID PANEL: CPT | Performed by: FAMILY MEDICINE

## 2022-03-21 PROCEDURE — 99000 SPECIMEN HANDLING OFFICE-LAB: CPT | Performed by: FAMILY MEDICINE

## 2022-03-21 PROCEDURE — 84443 ASSAY THYROID STIM HORMONE: CPT | Performed by: FAMILY MEDICINE

## 2022-03-21 PROCEDURE — 86769 SARS-COV-2 COVID-19 ANTIBODY: CPT | Mod: 90 | Performed by: FAMILY MEDICINE

## 2022-03-21 PROCEDURE — 99396 PREV VISIT EST AGE 40-64: CPT | Performed by: FAMILY MEDICINE

## 2022-03-21 PROCEDURE — 99213 OFFICE O/P EST LOW 20 MIN: CPT | Mod: 25 | Performed by: FAMILY MEDICINE

## 2022-03-21 PROCEDURE — 36415 COLL VENOUS BLD VENIPUNCTURE: CPT | Performed by: FAMILY MEDICINE

## 2022-03-21 PROCEDURE — 80053 COMPREHEN METABOLIC PANEL: CPT | Performed by: FAMILY MEDICINE

## 2022-03-21 PROCEDURE — 82306 VITAMIN D 25 HYDROXY: CPT | Performed by: FAMILY MEDICINE

## 2022-03-21 RX ORDER — ALPRAZOLAM 0.25 MG
TABLET ORAL
Qty: 30 TABLET | Refills: 0 | Status: SHIPPED | OUTPATIENT
Start: 2022-03-21 | End: 2022-12-30

## 2022-03-21 RX ORDER — PAROXETINE 10 MG/1
10 TABLET, FILM COATED ORAL EVERY MORNING
Qty: 90 TABLET | Refills: 3 | Status: SHIPPED | OUTPATIENT
Start: 2022-03-21 | End: 2022-12-30

## 2022-03-21 NOTE — PROGRESS NOTES
Assessment/Plan:     Patient presents today for routine physical examination.    Healthcare Maintenance: USPSTF recommendations for age 62;  Patient has been counseled on/screened for:  - intimate partner violence and there are no concerns at this time  - a healthful diet and physical activity for CVD prevention  - Diabetes and hyperlipidemia: screening was performed.   - Hep C, negative in 2019  - Asprin use: patient chose to consider continuing   Sexually transmitted infections: Patient would not like to be screened for chlamydia, gonorrhea, syphilis, HIV, and hepatitis  Colorectal Cancer: Colonoscopy due in 2025  Immunizations: up to date except for shingles and covid which was recommended  Cervical Cancer Screening: patient has been screened for cervical cancer per protocol in 2021, results were nil without hpv, due in 2026  Mammogram: ordered today      Additional concerns are as detailed below:    1. Anxiety state  Refilled patient's alprazolam.  She gets 1 prescription every calendar year.  Also dispensed refill on Paxil.  - ALPRAZolam (XANAX) 0.25 MG tablet; TAKE 1 TABLET BY MOUTH EVERY 8 HOURS AS NEEDED.  Dispense: 30 tablet; Refill: 0  - PARoxetine (PAXIL) 10 MG tablet; Take 1 tablet (10 mg) by mouth every morning  Dispense: 90 tablet; Refill: 3    2. Other viral warts  Patient did not tolerate in the office cryotherapy in the past and the warts do seem to be spreading.  Will refer to dermatology to see if they have more painless methods for removal.  - Adult Dermatology Referral; Future    3. History of COVID-19  Patient declines immunization today but will consider it in the future.  She would like her antibodies checked today.  - COVID-19 Rashard RBD Moira & Titer Reflex; Future    4. Hair loss  Also recommended changing her hair care regimen to be as gentle as possible.  This was discussed extensively.  - TSH with free T4 reflex; Future    5. Visit for screening mammogram  - MA SCREENING DIGITAL BILAT -  "Future  (s+30); Future    6. Healthcare maintenance  - Comprehensive metabolic panel (BMP + Alb, Alk Phos, ALT, AST, Total. Bili, TP); Future  - Lipid panel reflex to direct LDL Fasting; Future  - Vitamin D Deficiency; Future      AVS printed and given to patient.  Return to clinic in 1 year.     I have had an Advance Directives discussion with the patient.    This note has been dictated using voice recognition software. Any grammatical or context distortions are unintentional and inherent to the the software.     Tianna Zimmerman MD  Family [facility]    Subjective:     Monica Camacho is a 62 year old female who presents to clinic for routine physical.    Additional concerns include:    1. Hair loss:  - dramatic uptick in hair loss  - uses biotin and collagen    PHQ-2 Score:  0    Health Care Directive: discussed    Patient Care Team:   PCP: Tianna Zimmerman     Past Medical History, Family History, and Social History reviewed.     Review of systems:  Patient endorses: hot flashes  The remainder of the 10 system review is otherwise negative.    Objective:     /82   Pulse 90   Ht 1.651 m (5' 5\")   Wt 77.6 kg (171 lb)   SpO2 98%   BMI 28.46 kg/m    Gen: Alert, NAD, appears stated age, normal hygiene   Eyes: conjunctivae without injection, sclera clear, EOMI  ENT/mouth: nares clear, septum midline, absent rhinorrhea,absent pharyngeal injection, neck is supple, no thyroid enlargement  CV: RRR, no murmur appreciated, pedal edema absent bilaterally  Resp: CTAB, no wheezes, rales or ronchi  ABD: normoactive, non-tender to palpation, nondistended  MSK: grossly full range of motion in all joints, no obvious deformity  Neuro: CN II-XII grossly intact, no deficits in coordination  Psych: no apparent hallucinations or delusions, no pressured speech; alert, oriented x3  SKIN: dry and without lesions, warts present bilateral hands  Heme/lymph: no pallor, no active bleeding/bruising, no adenopathy " appreciated    Medications:  Current Outpatient Medications   Medication     ALPRAZolam (XANAX) 0.25 MG tablet     aspirin 81 MG EC tablet     calcium-vitamin D (CALCIUM-VITAMIN D) 500 mg(1,250mg) -200 unit per tablet     cholecalciferol, vitamin D3, (VITAMIN D3) 2,000 unit cap     multivitamin capsule     PARoxetine (PAXIL) 10 MG tablet     No current facility-administered medications for this visit.       Allergies:  Allergies   Allergen Reactions     Amoxicillin Unknown     Penicillins Unknown       PMH:  Past Medical History:   Diagnosis Date     Anxiety      Cold sore        PSH:  Past Surgical History:   Procedure Laterality Date     BIOPSY BREAST Right 1990    benign      SECTION         Family Hx:  Family History   Problem Relation Age of Onset     Colon Cancer Mother 58.00     Hyperlipidemia Mother      Hyperlipidemia Father      Hypertension Father      Cancer Father         skin cancer, not melanoma     Breast Cancer Other         Mat Cousin     Breast Cancer Maternal Grandmother 74.00     No Known Problems Paternal Grandmother      No Known Problems Paternal Grandfather      No Known Problems Maternal Aunt      No Known Problems Paternal Aunt      Hereditary Breast and Ovarian Cancer Syndrome No family hx of      Endometrial Cancer No family hx of      Ovarian Cancer No family hx of        Social History:  Social History     Socioeconomic History     Marital status:      Spouse name: Not on file     Number of children: Not on file     Years of education: Not on file     Highest education level: Not on file   Occupational History     Not on file   Tobacco Use     Smoking status: Never Smoker     Smokeless tobacco: Never Used   Substance and Sexual Activity     Alcohol use: Yes     Alcohol/week: 1.0 standard drink     Drug use: No     Sexual activity: Yes     Partners: Male     Birth control/protection: Post-menopausal   Other Topics Concern     Not on file   Social History Narrative      Not on file     Social Determinants of Health     Financial Resource Strain: Not on file   Food Insecurity: Not on file   Transportation Needs: Not on file   Physical Activity: Not on file   Stress: Not on file   Social Connections: Not on file   Intimate Partner Violence: Not on file   Housing Stability: Not on file       No components found for: LDLCALC     Immunization History   Administered Date(s) Administered     Tdap (Adacel,Boostrix) 07/22/2013         Answers for HPI/ROS submitted by the patient on 3/20/2022  Frequency of exercise:: 2-3 days/week  Getting at least 3 servings of Calcium per day:: Yes  Diet:: Regular (no restrictions)  Taking medications regularly:: Yes  Medication side effects:: None  Bi-annual eye exam:: Yes  Dental care twice a year:: Yes  Sleep apnea or symptoms of sleep apnea:: None  Additional concerns today:: Yes  Duration of exercise:: Greater than 60 minutes

## 2022-03-22 ENCOUNTER — TELEPHONE (OUTPATIENT)
Dept: FAMILY MEDICINE | Facility: CLINIC | Age: 62
End: 2022-03-22
Payer: COMMERCIAL

## 2022-03-22 LAB
DEPRECATED CALCIDIOL+CALCIFEROL SERPL-MC: 53 UG/L (ref 30–80)
SARS-COV-2 AB SERPL IA-ACNC: >250 U/ML
SARS-COV-2 AB SERPL QL IA: POSITIVE

## 2022-03-22 NOTE — TELEPHONE ENCOUNTER
Called pt to discuss statin. She technically qualifies but will work on diet/exercise and recheck next year.

## 2022-05-29 ENCOUNTER — HEALTH MAINTENANCE LETTER (OUTPATIENT)
Age: 62
End: 2022-05-29

## 2022-06-13 ENCOUNTER — TELEPHONE (OUTPATIENT)
Dept: FAMILY MEDICINE | Facility: CLINIC | Age: 62
End: 2022-06-13
Payer: COMMERCIAL

## 2022-06-13 NOTE — TELEPHONE ENCOUNTER
I spoke to the patient. Patient is aware that Dr. Zimmerman is on maternity leave. She would like the covering provider to advise supplementation.     Please advise.

## 2022-06-13 NOTE — TELEPHONE ENCOUNTER
Reason for Call:  Other call back    Detailed comments: Patient has started using the supplemental program Plexus she would like to know PCP's thoughts.  The Plexus supplement has green coffee bean extract and she is concerned of how it may interact with her Paxil medication.    Phone Number Patient can be reached at: Cell number on file:    Telephone Information:   Mobile [phone]       Best Time: Any    Can we leave a detailed message on this number? YES    Call taken on 6/13/2022 at 12:30 PM by Leia Robertson

## 2022-06-14 NOTE — TELEPHONE ENCOUNTER
Please let patient know that the combination of Plexus and antidepressant medications including Paxil is not recommended as it could increase the risk of serotonin syndrome.

## 2022-06-20 ENCOUNTER — ANCILLARY PROCEDURE (OUTPATIENT)
Dept: MAMMOGRAPHY | Facility: CLINIC | Age: 62
End: 2022-06-20
Attending: FAMILY MEDICINE
Payer: COMMERCIAL

## 2022-06-20 DIAGNOSIS — Z12.31 VISIT FOR SCREENING MAMMOGRAM: ICD-10-CM

## 2022-06-20 PROCEDURE — 77067 SCR MAMMO BI INCL CAD: CPT

## 2022-10-02 ENCOUNTER — HEALTH MAINTENANCE LETTER (OUTPATIENT)
Age: 62
End: 2022-10-02

## 2022-12-29 ENCOUNTER — TRANSFERRED RECORDS (OUTPATIENT)
Dept: HEALTH INFORMATION MANAGEMENT | Facility: CLINIC | Age: 62
End: 2022-12-29

## 2022-12-29 PROBLEM — K63.5 POLYP OF COLON: Status: ACTIVE | Noted: 2020-12-12

## 2022-12-30 ENCOUNTER — OFFICE VISIT (OUTPATIENT)
Dept: FAMILY MEDICINE | Facility: CLINIC | Age: 62
End: 2022-12-30
Payer: COMMERCIAL

## 2022-12-30 VITALS
WEIGHT: 174 LBS | HEART RATE: 84 BPM | OXYGEN SATURATION: 97 % | DIASTOLIC BLOOD PRESSURE: 84 MMHG | BODY MASS INDEX: 28.96 KG/M2 | SYSTOLIC BLOOD PRESSURE: 112 MMHG

## 2022-12-30 DIAGNOSIS — F41.1 ANXIETY STATE: ICD-10-CM

## 2022-12-30 DIAGNOSIS — Z76.89 ESTABLISHING CARE WITH NEW DOCTOR, ENCOUNTER FOR: Primary | ICD-10-CM

## 2022-12-30 DIAGNOSIS — R11.0 NAUSEA: ICD-10-CM

## 2022-12-30 PROCEDURE — 99213 OFFICE O/P EST LOW 20 MIN: CPT | Performed by: FAMILY MEDICINE

## 2022-12-30 RX ORDER — PAROXETINE 10 MG/1
10 TABLET, FILM COATED ORAL EVERY MORNING
Qty: 90 TABLET | Refills: 3 | Status: SHIPPED | OUTPATIENT
Start: 2022-12-30 | End: 2024-01-23

## 2022-12-30 RX ORDER — ONDANSETRON 4 MG/1
4 TABLET, FILM COATED ORAL EVERY 8 HOURS PRN
Qty: 20 TABLET | Refills: 1 | Status: SHIPPED | OUTPATIENT
Start: 2022-12-30 | End: 2024-08-27

## 2022-12-30 RX ORDER — ALPRAZOLAM 0.25 MG
TABLET ORAL
Qty: 30 TABLET | Refills: 0 | Status: SHIPPED | OUTPATIENT
Start: 2022-12-30 | End: 2023-11-01

## 2022-12-30 NOTE — PROGRESS NOTES
Assessment/Plan:    Establishing care with new doctor, encounter for  Establishing care today, all past medical history reviewed and updated in EMR.    Nausea  Patient reports issues with very occasional nausea which triggers anxiety/panic, Zofran refill provided today.  - ondansetron (ZOFRAN) 4 MG tablet  Dispense: 20 tablet; Refill: 1    Anxiety state  History of anxiety, patient reports controlled with Paxil 10 mg and very rarely needed Xanax; per prior PCPs notes patient has been given 30 tablets of Xanax per calendar year,  reviewed as below, I did provide a refill today which is slightly earlier than the next calendar year but will help cover when I am off on maternity leave.  - PARoxetine (PAXIL) 10 MG tablet  Dispense: 90 tablet; Refill: 3  - ALPRAZolam (XANAX) 0.25 MG tablet  Dispense: 30 tablet; Refill: 0       Follow up: May 2023 for physical    Gaby Miranda MD  [facility]    Subjective:   Monica Camacho is a 62 year old female is here today for establish care    -anxiety - paxil 10mg, xanax rarely (phobia: gagging/vomiting)  - reviewed - last fill xanax 3/21/22 for 30 tabs  -had COVID last year, wondering about zofran refill  -multivit, vit c, vit d, some other supplements, biotin    Answers for HPI/ROS submitted by the patient on 12/29/2022  What is the reason for your visit today? : To meet new doctor  How many servings of fruits and vegetables do you eat daily?: 2-3  On average, how many sweetened beverages do you drink each day (Examples: soda, juice, sweet tea, etc.  Do NOT count diet or artificially sweetened beverages)?: 0  How many minutes a day do you exercise enough to make your heart beat faster?: 30 to 60  How many days a week do you exercise enough to make your heart beat faster?: 4  How many days per week do you miss taking your medication?: 0      Patient Active Problem List   Diagnosis     Dyslipidemia     Anxiety     Polyp of colon     Past Medical History:    Diagnosis Date     Anxiety      Cold sore      Past Surgical History:   Procedure Laterality Date     BIOPSY BREAST Right     benign      SECTION       Current Outpatient Medications   Medication     ALPRAZolam (XANAX) 0.25 MG tablet     calcium-vitamin D (CALCIUM-VITAMIN D) 500 mg(1,250mg) -200 unit per tablet     cholecalciferol, vitamin D3, (VITAMIN D3) 2,000 unit cap     multivitamin capsule     ondansetron (ZOFRAN) 4 MG tablet     PARoxetine (PAXIL) 10 MG tablet     No current facility-administered medications for this visit.     Allergies   Allergen Reactions     Amoxicillin Unknown     Sinus infex on day 7/10 developed rash/hives     Penicillins Unknown     Sinus infex on day 7/10 developed rash/hives     Social History     Socioeconomic History     Marital status:      Spouse name: Not on file     Number of children: Not on file     Years of education: Not on file     Highest education level: Not on file   Occupational History     Not on file   Tobacco Use     Smoking status: Never     Smokeless tobacco: Never   Substance and Sexual Activity     Alcohol use: Yes     Alcohol/week: 1.0 standard drink     Drug use: No     Sexual activity: Yes     Partners: Male     Birth control/protection: Post-menopausal   Other Topics Concern     Not on file   Social History Narrative     Not on file     Social Determinants of Health     Financial Resource Strain: Not on file   Food Insecurity: Not on file   Transportation Needs: Not on file   Physical Activity: Not on file   Stress: Not on file   Social Connections: Not on file   Intimate Partner Violence: Not on file   Housing Stability: Not on file     Family History   Problem Relation Age of Onset     Colon Cancer Mother 58     Hyperlipidemia Mother      Hyperlipidemia Father      Hypertension Father      Cancer Father         skin cancer, not melanoma     No Known Problems Sister      No Known Problems Sister      Breast Cancer Maternal  Grandmother 74     No Known Problems Maternal Grandfather      Alzheimer Disease Paternal Grandmother      No Known Problems Paternal Grandfather      No Known Problems Maternal Aunt      No Known Problems Paternal Aunt      Breast Cancer Other 54        Mat Cousin     Hereditary Breast and Ovarian Cancer Syndrome No family hx of      Endometrial Cancer No family hx of      Ovarian Cancer No family hx of      Review of systems is as stated in HPI, and the remainder of system review is otherwise negative.    Objective:     /84   Pulse 84   Wt 78.9 kg (174 lb)   SpO2 97%   BMI 28.96 kg/m      General appearance: awake, NAD  HEENT: atraumatic, normocephalic, no scleral icterus or injection  Lungs: breathing comfortably on room air  Extremities: moving all extremities  Skin: no rashes or lesions  Neuro: alert, oriented x3, CNs grossly intact, no focal deficits appreciated  Psych: normal mood/affect/behavior, answering questions appropriately, linear thought process

## 2023-05-20 ENCOUNTER — HEALTH MAINTENANCE LETTER (OUTPATIENT)
Age: 63
End: 2023-05-20

## 2023-06-01 NOTE — PROGRESS NOTES
Assessment/Plan:   Renae is a 63 year old female here for physical     Routine adult health maintenance  Physical completed today. Vaccine ordered as below. Labs as below. Up to date with pap smear and colon cancer screening. Plan mammogram next year. Discussed going back to dermatology for warts on hands.  - Basic metabolic panel  (Ca, Cl, CO2, Creat, Gluc, K, Na, BUN)  - CBC with platelets    Anxiety   reviewed, last fill 12/30 for 30 tabs, pt uses xanax infrequently, will let me know when she needs a refill.     Dyslipidemia  Hx HLD, has not tolerated statins in the past, will check levels today.  - Lipid panel reflex to direct LDL Non-fasting    Diabetes mellitus screening  Given age, will screen for diabetes today.  - Hemoglobin A1c    Encounter for vaccination  Due for Tdap in July, ordered today for future nurse visit.  - TDAP VACCINE (Adacel, Boostrix)  [[id number]]       I have had an Advance Directives discussion with the patient.  The following high BMI interventions were performed this visit: encouragement to exercise and lifestyle education regarding diet    Follow up: 1 year for physical, sooner as needed    Gaby Miranda MD  [facility]      Subjective:     Monica Camacho is a 63 year old female who presents for an annual exam.     Answers for HPI/ROS submitted by the patient on 6/2/2023  Frequency of exercise:: 2-3 days/week  Getting at least 3 servings of Calcium per day:: Yes  Diet:: Regular (no restrictions)  Taking medications regularly:: Yes  Medication side effects:: None  Bi-annual eye exam:: Yes  Dental care twice a year:: Yes  Sleep apnea or symptoms of sleep apnea:: None  abdominal pain: No  Blood in stool: No  Blood in urine: No  chest pain: No  chills: No  congestion: No  constipation: No  cough: No  diarrhea: No  dizziness: No  ear pain: No  eye pain: No  nervous/anxious: No  fever: No  frequency: No  genital sores: No  headaches: No  hearing loss: No  heartburn:  No  arthralgias: No  joint swelling: No  peripheral edema: No  mood changes: No  myalgias: No  nausea: No  dysuria: No  palpitations: No  Skin sensation changes: No  sore throat: No  urgency: No  rash: No  shortness of breath: No  visual disturbance: No  weakness: No  pelvic pain: No  vaginal bleeding: No  vaginal discharge: No  tenderness: No  breast mass: No  breast discharge: No  Additional concerns today:: No  Duration of exercise:: 45-60 minutes    Warts on hands   HLD - tried statins and muscle aches    Health Maintenance reviewed:  Lipid Profile: yes  Glucose Screen: yes  Colonoscopy: up to date  Mammogram: plan next year    Gynecologic History  No LMP recorded. Patient is postmenopausal.  Contraception: post menopausal status  Last Pap: 2021. Results were: nml with neg HPV - repeat 5 yrs    Immunization History   Administered Date(s) Administered     TDAP (Adacel,Boostrix) 07/22/2013     Immunization status: up to date.    Current Outpatient Medications   Medication Sig Dispense Refill     calcium-vitamin D (CALCIUM-VITAMIN D) 500 mg(1,250mg) -200 unit per tablet [CALCIUM-VITAMIN D (CALCIUM-VITAMIN D) 500 MG(1,250MG) -200 UNIT PER TABLET] Take 1 tablet by mouth 2 (two) times a day with meals.       cholecalciferol, vitamin D3, (VITAMIN D3) 2,000 unit cap [CHOLECALCIFEROL, VITAMIN D3, (VITAMIN D3) 2,000 UNIT CAP] Take by mouth.       cyanocobalamin (VITAMIN B-12) 100 MCG tablet Take 100 mcg by mouth daily       multivitamin capsule [MULTIVITAMIN CAPSULE] Take 1 capsule by mouth daily.       PARoxetine (PAXIL) 10 MG tablet Take 1 tablet (10 mg) by mouth every morning 90 tablet 3     ALPRAZolam (XANAX) 0.25 MG tablet TAKE 1 TABLET BY MOUTH EVERY 8 HOURS AS NEEDED. (Patient not taking: Reported on 6/2/2023) 30 tablet 0     ondansetron (ZOFRAN) 4 MG tablet Take 1 tablet (4 mg) by mouth every 8 hours as needed for nausea (Patient not taking: Reported on 6/2/2023) 20 tablet 1     Past Medical History:   Diagnosis  Date     Anxiety      Cold sore      Past Surgical History:   Procedure Laterality Date     BIOPSY BREAST Right     benign      SECTION  1985     Amoxicillin and Penicillins  Family History   Problem Relation Age of Onset     Colon Cancer Mother 58     Hyperlipidemia Mother      Hyperlipidemia Father      Hypertension Father      Cancer Father         skin cancer, not melanoma     No Known Problems Sister      No Known Problems Sister      Breast Cancer Maternal Grandmother 74     No Known Problems Maternal Grandfather      Alzheimer Disease Paternal Grandmother      No Known Problems Paternal Grandfather      No Known Problems Maternal Aunt      No Known Problems Paternal Aunt      Breast Cancer Other 54        Mat Cousin     Hereditary Breast and Ovarian Cancer Syndrome No family hx of      Endometrial Cancer No family hx of      Ovarian Cancer No family hx of      Social History     Socioeconomic History     Marital status:      Spouse name: Not on file     Number of children: Not on file     Years of education: Not on file     Highest education level: Not on file   Occupational History     Not on file   Tobacco Use     Smoking status: Never     Smokeless tobacco: Never   Vaping Use     Vaping status: Not on file   Substance and Sexual Activity     Alcohol use: Yes     Alcohol/week: 1.0 standard drink of alcohol     Drug use: No     Sexual activity: Yes     Partners: Male     Birth control/protection: Post-menopausal   Other Topics Concern     Not on file   Social History Narrative     Not on file     Social Determinants of Health     Financial Resource Strain: Not on file   Food Insecurity: Not on file   Transportation Needs: Not on file   Physical Activity: Not on file   Stress: Not on file   Social Connections: Not on file   Intimate Partner Violence: Not on file   Housing Stability: Not on file       Review of Systems negative unless noted    Objective:        Vitals:    23 1324   BP:  "126/82   Pulse: 95   Resp: 16   Temp: 99.2  F (37.3  C)   TempSrc: Temporal   SpO2: 94%   Weight: 80.3 kg (177 lb 1.6 oz)   Height: 1.648 m (5' 4.88\")   PainSc: No Pain (0)     Body mass index is 29.58 kg/m .    Physical Exam:  General Appearance: Alert, pleasant, appears stated age  Head: Normocephalic, without obvious abnormality  Eyes: PERRL, conjunctiva/corneas clear, EOM's intact  Ears: Normal TM's and external ear canals, both ears  Nose: Nares normal, septum midline,mucosa normal, no drainage  Throat: Lips, mucosa, and tongue normal; teeth and gums normal; oropharynx is clear  Neck: Supple,without lymphadenopathy, no thyromegaly or nodules noted  Lungs: Clear to auscultation bilaterally, respirations unlabored, no wheezing or crackles  Heart: Regular rate and rhythm, no murmur   Breast:  Normal appearance.  No palpable masses, nipple discharge, or skin changes  Abdomen: Soft, non-tender, no masses, no organomegaly  Extremities: Extremities with strong and symmetric pulses, no cyanosis or edema  Skin: Skin color, texture normal, no rashes or lesions  Neurologic: Grossly normal, no focal deficits    "

## 2023-06-02 ENCOUNTER — OFFICE VISIT (OUTPATIENT)
Dept: FAMILY MEDICINE | Facility: CLINIC | Age: 63
End: 2023-06-02
Payer: COMMERCIAL

## 2023-06-02 VITALS
SYSTOLIC BLOOD PRESSURE: 126 MMHG | HEIGHT: 65 IN | RESPIRATION RATE: 16 BRPM | TEMPERATURE: 99.2 F | HEART RATE: 95 BPM | OXYGEN SATURATION: 94 % | WEIGHT: 177.1 LBS | DIASTOLIC BLOOD PRESSURE: 82 MMHG | BODY MASS INDEX: 29.51 KG/M2

## 2023-06-02 DIAGNOSIS — Z00.00 ROUTINE ADULT HEALTH MAINTENANCE: Primary | ICD-10-CM

## 2023-06-02 DIAGNOSIS — Z13.1 DIABETES MELLITUS SCREENING: ICD-10-CM

## 2023-06-02 DIAGNOSIS — F41.9 ANXIETY: ICD-10-CM

## 2023-06-02 DIAGNOSIS — E78.5 DYSLIPIDEMIA: ICD-10-CM

## 2023-06-02 DIAGNOSIS — Z23 ENCOUNTER FOR VACCINATION: ICD-10-CM

## 2023-06-02 LAB
ANION GAP SERPL CALCULATED.3IONS-SCNC: 13 MMOL/L (ref 7–15)
BUN SERPL-MCNC: 16.4 MG/DL (ref 8–23)
CALCIUM SERPL-MCNC: 9.8 MG/DL (ref 8.8–10.2)
CHLORIDE SERPL-SCNC: 103 MMOL/L (ref 98–107)
CHOLEST SERPL-MCNC: 310 MG/DL
CREAT SERPL-MCNC: 0.71 MG/DL (ref 0.51–0.95)
DEPRECATED HCO3 PLAS-SCNC: 25 MMOL/L (ref 22–29)
ERYTHROCYTE [DISTWIDTH] IN BLOOD BY AUTOMATED COUNT: 13.1 % (ref 10–15)
GFR SERPL CREATININE-BSD FRML MDRD: >90 ML/MIN/1.73M2
GLUCOSE SERPL-MCNC: 121 MG/DL (ref 70–99)
HBA1C MFR BLD: 5.6 % (ref 0–5.6)
HCT VFR BLD AUTO: 42.4 % (ref 35–47)
HDLC SERPL-MCNC: 44 MG/DL
HGB BLD-MCNC: 14.3 G/DL (ref 11.7–15.7)
LDLC SERPL CALC-MCNC: ABNORMAL MG/DL
MCH RBC QN AUTO: 29.1 PG (ref 26.5–33)
MCHC RBC AUTO-ENTMCNC: 33.7 G/DL (ref 31.5–36.5)
MCV RBC AUTO: 86 FL (ref 78–100)
NONHDLC SERPL-MCNC: 266 MG/DL
PLATELET # BLD AUTO: 242 10E3/UL (ref 150–450)
POTASSIUM SERPL-SCNC: 3.6 MMOL/L (ref 3.4–5.3)
RBC # BLD AUTO: 4.92 10E6/UL (ref 3.8–5.2)
SODIUM SERPL-SCNC: 141 MMOL/L (ref 136–145)
TRIGL SERPL-MCNC: 405 MG/DL
WBC # BLD AUTO: 5.8 10E3/UL (ref 4–11)

## 2023-06-02 PROCEDURE — 83721 ASSAY OF BLOOD LIPOPROTEIN: CPT | Mod: 59 | Performed by: FAMILY MEDICINE

## 2023-06-02 PROCEDURE — 80061 LIPID PANEL: CPT | Performed by: FAMILY MEDICINE

## 2023-06-02 PROCEDURE — 80048 BASIC METABOLIC PNL TOTAL CA: CPT | Performed by: FAMILY MEDICINE

## 2023-06-02 PROCEDURE — 36415 COLL VENOUS BLD VENIPUNCTURE: CPT | Performed by: FAMILY MEDICINE

## 2023-06-02 PROCEDURE — 99396 PREV VISIT EST AGE 40-64: CPT | Performed by: FAMILY MEDICINE

## 2023-06-02 PROCEDURE — 85027 COMPLETE CBC AUTOMATED: CPT | Performed by: FAMILY MEDICINE

## 2023-06-02 PROCEDURE — 83036 HEMOGLOBIN GLYCOSYLATED A1C: CPT | Performed by: FAMILY MEDICINE

## 2023-06-02 PROCEDURE — 99213 OFFICE O/P EST LOW 20 MIN: CPT | Mod: 25 | Performed by: FAMILY MEDICINE

## 2023-06-02 RX ORDER — UBIDECARENONE 75 MG
100 CAPSULE ORAL DAILY
COMMUNITY

## 2023-06-02 ASSESSMENT — ENCOUNTER SYMPTOMS
BREAST MASS: 0
NAUSEA: 0
PALPITATIONS: 0
FREQUENCY: 0
EYE PAIN: 0
HEMATOCHEZIA: 0
JOINT SWELLING: 0
SHORTNESS OF BREATH: 0
HEARTBURN: 0
PARESTHESIAS: 0
MYALGIAS: 0
HEMATURIA: 0
HEADACHES: 0
DIARRHEA: 0
FEVER: 0
NERVOUS/ANXIOUS: 0
ARTHRALGIAS: 0
CHILLS: 0
CONSTIPATION: 0
DYSURIA: 0
SORE THROAT: 0
COUGH: 0
ABDOMINAL PAIN: 0
DIZZINESS: 0
WEAKNESS: 0

## 2023-06-02 ASSESSMENT — PAIN SCALES - GENERAL: PAINLEVEL: NO PAIN (0)

## 2023-06-03 LAB — LDLC SERPL DIRECT ASSAY-MCNC: 198 MG/DL

## 2023-06-09 ENCOUNTER — MYC MEDICAL ADVICE (OUTPATIENT)
Dept: FAMILY MEDICINE | Facility: CLINIC | Age: 63
End: 2023-06-09
Payer: COMMERCIAL

## 2023-11-01 DIAGNOSIS — F41.1 ANXIETY STATE: ICD-10-CM

## 2023-11-01 RX ORDER — ALPRAZOLAM 0.25 MG
TABLET ORAL
Qty: 30 TABLET | Refills: 0 | Status: SHIPPED | OUTPATIENT
Start: 2023-11-01 | End: 2024-04-02

## 2023-11-02 NOTE — TELEPHONE ENCOUNTER
reviewed - last fill 12/30/22 for 30 tabs - ok for refill  Per prior PCP ok for 30 tabs/year - will discuss decreasing at next visit    Gaby Miranda MD  [facility]

## 2024-01-23 DIAGNOSIS — F41.1 ANXIETY STATE: ICD-10-CM

## 2024-01-23 RX ORDER — PAROXETINE 10 MG/1
10 TABLET, FILM COATED ORAL EVERY MORNING
Qty: 90 TABLET | Refills: 0 | Status: SHIPPED | OUTPATIENT
Start: 2024-01-23 | End: 2024-04-19

## 2024-01-23 NOTE — TELEPHONE ENCOUNTER
Pending Prescriptions:                       Disp   Refills    PARoxetine (PAXIL) 10 MG tablet           90 tab*3            Sig: Take 1 tablet (10 mg) by mouth every morning

## 2024-04-19 DIAGNOSIS — F41.1 ANXIETY STATE: ICD-10-CM

## 2024-04-19 RX ORDER — PAROXETINE 10 MG/1
10 TABLET, FILM COATED ORAL EVERY MORNING
Qty: 90 TABLET | Refills: 0 | Status: SHIPPED | OUTPATIENT
Start: 2024-04-19 | End: 2024-07-17

## 2024-07-17 DIAGNOSIS — F41.1 ANXIETY STATE: ICD-10-CM

## 2024-07-17 RX ORDER — PAROXETINE 10 MG/1
10 TABLET, FILM COATED ORAL EVERY MORNING
Qty: 90 TABLET | Refills: 0 | Status: SHIPPED | OUTPATIENT
Start: 2024-07-17 | End: 2024-08-27

## 2024-07-27 ENCOUNTER — HEALTH MAINTENANCE LETTER (OUTPATIENT)
Age: 64
End: 2024-07-27

## 2024-08-05 DIAGNOSIS — F41.1 ANXIETY STATE: ICD-10-CM

## 2024-08-05 RX ORDER — ALPRAZOLAM 0.25 MG
TABLET ORAL
Qty: 30 TABLET | Refills: 0 | Status: SHIPPED | OUTPATIENT
Start: 2024-08-05

## 2024-08-06 NOTE — TELEPHONE ENCOUNTER
reviewed - last fill 4/2 for 10 tabs (previously was doing 30 tabs/year with prior PCP) - ok for refill    Gaby Miranda MD  [facility]

## 2024-08-22 SDOH — HEALTH STABILITY: PHYSICAL HEALTH: ON AVERAGE, HOW MANY MINUTES DO YOU ENGAGE IN EXERCISE AT THIS LEVEL?: 30 MIN

## 2024-08-22 SDOH — HEALTH STABILITY: PHYSICAL HEALTH: ON AVERAGE, HOW MANY DAYS PER WEEK DO YOU ENGAGE IN MODERATE TO STRENUOUS EXERCISE (LIKE A BRISK WALK)?: 5 DAYS

## 2024-08-22 ASSESSMENT — SOCIAL DETERMINANTS OF HEALTH (SDOH): HOW OFTEN DO YOU GET TOGETHER WITH FRIENDS OR RELATIVES?: ONCE A WEEK

## 2024-08-23 NOTE — PROGRESS NOTES
Assessment/Plan:   Renae is a 64 year old female here for physical     Routine adult health maintenance  Physical completed today.  Labs as below.  Due for mammogram, ordered today.  Vaccine as below.  Up-to-date with Pap smear and colon cancer screening.  - Basic metabolic panel  (Ca, Cl, CO2, Creat, Gluc, K, Na, BUN)  - CBC with platelets  - Vitamin D deficiency screening    Dyslipidemia  History of dyslipidemia, not on statin therapy, recheck levels today.  - Lipid panel reflex to direct LDL Non-fasting    Diabetes mellitus screening  Given age, will screen for diabetes today.  - Hemoglobin A1c    Nausea  Patient request refill of Zofran as needed for nausea.  - ondansetron (ZOFRAN) 4 MG tablet  Dispense: 20 tablet; Refill: 1    Anxiety state  Patient currently taking paroxetine 10 mg daily and occasional Xanax,  reviewed, Paxil refilled today.  - PARoxetine (PAXIL) 10 MG tablet  Dispense: 90 tablet; Refill: 0    Screening mammogram for breast cancer  Due for mammogram, ordered today.  - MA Screen Bilateral w/Juan    Encounter for vaccination  Due for Tdap, administered today.  - TDAP 7+ (ADACEL,BOOSTRIX)       I have had an Advance Directives discussion with the patient.    Follow up: 1 year for physical, sooner as needed    Gaby Miranda MD  [facility]      Subjective:     Monica Camacho is a 64 year old female who presents for an annual exam.     Question 8/22/2024  8:22 PM CDT - Filed by Patient   In general, how would you rate your overall physical health? Excellent   Do you get at least 3 servings of foods that have calcium each day (dairy, green leafy vegetables, etc)? Yes   Do you have a special diet? Regular (no restrictions)   How many servings of fruits and vegetables do you eat daily? (!) 2-3   On average, how many sweetened beverages do you drink each day (Examples: soda, juice, sweet tea, etc.)? Do NOT count diet or artificially sweetened beverages. 0-1   On average, how many  days per week do you engage in moderate to strenuous exercise (like a brisk walk)? 5 days   On average, how many minutes do you engage in exercise at this level? 30 min   How often do you get together with friends or relatives? Once a week   Have you fallen 2 or more times in the past year? No   Trouble with walking or balance? No   Do you see a dentist two times every year? Yes   Do you feel stress - tense, restless, nervous, or anxious, or unable to sleep at night because your mind is troubled all the time - these days? Not at all   If you drink alcohol, do you typically have more than 3 drinks per day OR more than 7 drinks per week? No   Do you use any substances not prescribed by a provider, out of habit or for other reasons? No   Have you had any new sexual partners since you were last tested for sexually transmitted infections, including HIV? No   Within the past 12 months, did you worry that your food would run out before you got money to buy more? No   Within the past 12 months, did the food you bought just not last and you didn t have money to get more? No   Do you have housing? (Housing is defined as stable permanent housing and does not include staying ouside in a car, in a tent, in an abandoned building, in an overnight shelter, or couch-surfing.) Yes   Are you worried about losing your housing? No   Within the past 12 months, has lack of transportation kept you from medical appointments, getting your medicines, non-medical meetings or appointments, work, or from getting things that you need? No   Within the past 12 months, have you or your family members you live with been unable to get utilities (heat, electricity) when it was really needed? No   Were you born outside of the [place]? No     Question 8/27/2024  1:23 PM CDT - Filed by Patient   The following questionnaire should only be answered by the patient. Are you the patient? Yes        Over the last 2 weeks, how often have you been bothered by any of  the following problems?    1. Little interest or pleasure in doing things Not at all   2.  Feeling down, depressed, or hopeless Not at all   3.  Trouble falling or staying asleep, or sleeping too much Not at all   4.  Feeling tired or having little energy Not at all   5.  Poor appetite or overeating Not at all   6.  Feeling bad about yourself - or that you are a failure or have let yourself or your family down Not at all   7.  Trouble concentrating on things, such as reading the newspaper or watching television Not at all   8.  Moving or speaking so slowly that other people could have noticed. Or the opposite - being so fidgety or restless that you have been moving around a lot more than usual Not at all   9.  Thoughts that you would be better off dead, or of hurting yourself in some way Not at all   If you checked off any problems, how difficult have these problems made it for you to do your work, take care of things at home, or get along with other people? Not difficult at all        PHQ9 TOTAL SCORE (range: 0 - 27) 0     Had some more stress this past year - son's wedding - had a few more fills this year d/t life stress   reviewed - last fill 8/6/24 xanax for 30 tabs (prior 4/2 for 10 and 11/2 for 30)    Health Maintenance reviewed:  Lipid Profile: yes  Glucose Screen: yes  Colonoscopy: up to date  Mammogram: due    Gynecologic History  No LMP recorded. Patient is postmenopausal.  Last Pap: 2021. Results were: nml - due in 5 yrs    Immunization History   Administered Date(s) Administered    TDAP (Adacel,Boostrix) 07/22/2013, 08/27/2024     Immunization status: up to date and documented.    Current Outpatient Medications   Medication Sig Dispense Refill    ALPRAZolam (XANAX) 0.25 MG tablet TAKE 1 TABLET BY MOUTH EVERY 8 HOURS AS NEEDED 30 tablet 0    calcium-vitamin D (CALCIUM-VITAMIN D) 500 mg(1,250mg) -200 unit per tablet [CALCIUM-VITAMIN D (CALCIUM-VITAMIN D) 500 MG(1,250MG) -200 UNIT PER TABLET] Take 1  tablet by mouth 2 (two) times a day with meals.      cholecalciferol, vitamin D3, (VITAMIN D3) 2,000 unit cap [CHOLECALCIFEROL, VITAMIN D3, (VITAMIN D3) 2,000 UNIT CAP] Take by mouth.      cyanocobalamin (VITAMIN B-12) 100 MCG tablet Take 100 mcg by mouth daily      multivitamin capsule [MULTIVITAMIN CAPSULE] Take 1 capsule by mouth daily.      ondansetron (ZOFRAN) 4 MG tablet Take 1 tablet (4 mg) by mouth every 8 hours as needed for nausea. 20 tablet 1    PARoxetine (PAXIL) 10 MG tablet Take 1 tablet (10 mg) by mouth every morning. 90 tablet 0     Past Medical History:   Diagnosis Date    Anxiety     Cold sore      Past Surgical History:   Procedure Laterality Date    BIOPSY BREAST Right     benign     SECTION       Amoxicillin and Penicillins  Family History   Problem Relation Age of Onset    Colon Cancer Mother 58    Hyperlipidemia Mother     Hyperlipidemia Father     Hypertension Father     Cancer Father         skin cancer, not melanoma    No Known Problems Sister     No Known Problems Sister     Breast Cancer Maternal Grandmother 74    No Known Problems Maternal Grandfather     Alzheimer Disease Paternal Grandmother     No Known Problems Paternal Grandfather     No Known Problems Maternal Aunt     No Known Problems Paternal Aunt     Breast Cancer Other 54        Mat Cousin    Hereditary Breast and Ovarian Cancer Syndrome No family hx of     Endometrial Cancer No family hx of     Ovarian Cancer No family hx of      Social History     Socioeconomic History    Marital status:      Spouse name: Not on file    Number of children: Not on file    Years of education: Not on file    Highest education level: Not on file   Occupational History    Not on file   Tobacco Use    Smoking status: Never    Smokeless tobacco: Never   Vaping Use    Vaping status: Never Used   Substance and Sexual Activity    Alcohol use: Yes     Alcohol/week: 1.0 standard drink of alcohol    Drug use: No    Sexual activity:  Yes     Partners: Male     Birth control/protection: Post-menopausal   Other Topics Concern    Not on file   Social History Narrative    Not on file     Social Determinants of Health     Financial Resource Strain: Low Risk  (8/22/2024)    Financial Resource Strain     Within the past 12 months, have you or your family members you live with been unable to get utilities (heat, electricity) when it was really needed?: No   Food Insecurity: Low Risk  (8/22/2024)    Food Insecurity     Within the past 12 months, did you worry that your food would run out before you got money to buy more?: No     Within the past 12 months, did the food you bought just not last and you didn t have money to get more?: No   Transportation Needs: Low Risk  (8/22/2024)    Transportation Needs     Within the past 12 months, has lack of transportation kept you from medical appointments, getting your medicines, non-medical meetings or appointments, work, or from getting things that you need?: No   Physical Activity: Sufficiently Active (8/22/2024)    Exercise Vital Sign     Days of Exercise per Week: 5 days     Minutes of Exercise per Session: 30 min   Stress: No Stress Concern Present (8/22/2024)    [employer] - Occupational Stress Questionnaire     Feeling of Stress : Not at all   Social Connections: Unknown (8/22/2024)    Social Connection and Isolation Panel [NHANES]     Frequency of Communication with Friends and Family: Not on file     Frequency of Social Gatherings with Friends and Family: Once a week     Attends [employer] Services: Not on file     Active Member of Clubs or Organizations: Not on file     Attends Club or Organization Meetings: Not on file     Marital Status: Not on file   Interpersonal Safety: Not on file   Housing Stability: Low Risk  (8/22/2024)    Housing Stability     Do you have housing? : Yes     Are you worried about losing your housing?: No       Review of Systems negative unless  "noted    Objective:        Vitals:    08/27/24 1320   BP: 126/78   Pulse: 82   Resp: 22   Temp: 98.3  F (36.8  C)   TempSrc: Temporal   SpO2: 97%   Weight: 80.1 kg (176 lb 9.6 oz)   Height: 1.644 m (5' 4.72\")   PainSc: No Pain (0)     Body mass index is 29.64 kg/m .    Physical Exam:  General Appearance: Alert, pleasant, appears stated age  Head: Normocephalic, without obvious abnormality  Eyes: PERRL, conjunctiva/corneas clear, EOM's intact  Ears: Normal TM's and external ear canals, both ears  Nose: Nares normal, septum midline,mucosa normal, no drainage  Throat: Lips, mucosa, and tongue normal; teeth and gums normal; oropharynx is clear  Neck: Supple,without lymphadenopathy, no thyromegaly or nodules noted  Lungs: Clear to auscultation bilaterally, respirations unlabored, no wheezing or crackles  Heart: Regular rate and rhythm, no murmur   Abdomen: Soft, non-tender, no masses, no organomegaly  Extremities: Extremities with strong and symmetric pulses, no cyanosis or edema  Skin: Skin color, texture normal, no rashes or lesions  Neurologic: Grossly normal, no focal deficits      "

## 2024-08-27 ENCOUNTER — OFFICE VISIT (OUTPATIENT)
Dept: FAMILY MEDICINE | Facility: CLINIC | Age: 64
End: 2024-08-27
Payer: COMMERCIAL

## 2024-08-27 VITALS
SYSTOLIC BLOOD PRESSURE: 126 MMHG | TEMPERATURE: 98.3 F | BODY MASS INDEX: 29.42 KG/M2 | DIASTOLIC BLOOD PRESSURE: 78 MMHG | WEIGHT: 176.6 LBS | HEIGHT: 65 IN | HEART RATE: 82 BPM | RESPIRATION RATE: 22 BRPM | OXYGEN SATURATION: 97 %

## 2024-08-27 DIAGNOSIS — Z12.31 SCREENING MAMMOGRAM FOR BREAST CANCER: ICD-10-CM

## 2024-08-27 DIAGNOSIS — R11.0 NAUSEA: ICD-10-CM

## 2024-08-27 DIAGNOSIS — Z00.00 ROUTINE ADULT HEALTH MAINTENANCE: Primary | ICD-10-CM

## 2024-08-27 DIAGNOSIS — Z23 ENCOUNTER FOR VACCINATION: ICD-10-CM

## 2024-08-27 DIAGNOSIS — Z13.1 DIABETES MELLITUS SCREENING: ICD-10-CM

## 2024-08-27 DIAGNOSIS — E78.5 DYSLIPIDEMIA: ICD-10-CM

## 2024-08-27 DIAGNOSIS — F41.1 ANXIETY STATE: ICD-10-CM

## 2024-08-27 LAB
ERYTHROCYTE [DISTWIDTH] IN BLOOD BY AUTOMATED COUNT: 13.3 % (ref 10–15)
HBA1C MFR BLD: 5.7 % (ref 0–5.6)
HCT VFR BLD AUTO: 43.3 % (ref 35–47)
HGB BLD-MCNC: 14.2 G/DL (ref 11.7–15.7)
MCH RBC QN AUTO: 28.6 PG (ref 26.5–33)
MCHC RBC AUTO-ENTMCNC: 32.8 G/DL (ref 31.5–36.5)
MCV RBC AUTO: 87 FL (ref 78–100)
PLATELET # BLD AUTO: 241 10E3/UL (ref 150–450)
RBC # BLD AUTO: 4.97 10E6/UL (ref 3.8–5.2)
WBC # BLD AUTO: 5.8 10E3/UL (ref 4–11)

## 2024-08-27 PROCEDURE — 80048 BASIC METABOLIC PNL TOTAL CA: CPT | Performed by: FAMILY MEDICINE

## 2024-08-27 PROCEDURE — 83036 HEMOGLOBIN GLYCOSYLATED A1C: CPT | Performed by: FAMILY MEDICINE

## 2024-08-27 PROCEDURE — 90471 IMMUNIZATION ADMIN: CPT | Performed by: FAMILY MEDICINE

## 2024-08-27 PROCEDURE — 80061 LIPID PANEL: CPT | Performed by: FAMILY MEDICINE

## 2024-08-27 PROCEDURE — 82306 VITAMIN D 25 HYDROXY: CPT | Performed by: FAMILY MEDICINE

## 2024-08-27 PROCEDURE — 36415 COLL VENOUS BLD VENIPUNCTURE: CPT | Performed by: FAMILY MEDICINE

## 2024-08-27 PROCEDURE — 99396 PREV VISIT EST AGE 40-64: CPT | Mod: 25 | Performed by: FAMILY MEDICINE

## 2024-08-27 PROCEDURE — 85027 COMPLETE CBC AUTOMATED: CPT | Performed by: FAMILY MEDICINE

## 2024-08-27 PROCEDURE — 90715 TDAP VACCINE 7 YRS/> IM: CPT | Performed by: FAMILY MEDICINE

## 2024-08-27 RX ORDER — PAROXETINE 10 MG/1
10 TABLET, FILM COATED ORAL EVERY MORNING
Qty: 90 TABLET | Refills: 0 | Status: SHIPPED | OUTPATIENT
Start: 2024-08-27

## 2024-08-27 RX ORDER — ONDANSETRON 4 MG/1
4 TABLET, FILM COATED ORAL EVERY 8 HOURS PRN
Qty: 20 TABLET | Refills: 1 | Status: SHIPPED | OUTPATIENT
Start: 2024-08-27

## 2024-08-27 ASSESSMENT — PATIENT HEALTH QUESTIONNAIRE - PHQ9
SUM OF ALL RESPONSES TO PHQ QUESTIONS 1-9: 0
10. IF YOU CHECKED OFF ANY PROBLEMS, HOW DIFFICULT HAVE THESE PROBLEMS MADE IT FOR YOU TO DO YOUR WORK, TAKE CARE OF THINGS AT HOME, OR GET ALONG WITH OTHER PEOPLE: NOT DIFFICULT AT ALL
SUM OF ALL RESPONSES TO PHQ QUESTIONS 1-9: 0

## 2024-08-27 ASSESSMENT — PAIN SCALES - GENERAL: PAINLEVEL: NO PAIN (0)

## 2024-08-28 ENCOUNTER — TELEPHONE (OUTPATIENT)
Dept: FAMILY MEDICINE | Facility: CLINIC | Age: 64
End: 2024-08-28
Payer: COMMERCIAL

## 2024-08-28 LAB
ANION GAP SERPL CALCULATED.3IONS-SCNC: 13 MMOL/L (ref 7–15)
BUN SERPL-MCNC: 16.9 MG/DL (ref 8–23)
CALCIUM SERPL-MCNC: 9.9 MG/DL (ref 8.8–10.4)
CHLORIDE SERPL-SCNC: 101 MMOL/L (ref 98–107)
CHOLEST SERPL-MCNC: 350 MG/DL
CREAT SERPL-MCNC: 0.66 MG/DL (ref 0.51–0.95)
EGFRCR SERPLBLD CKD-EPI 2021: >90 ML/MIN/1.73M2
FASTING STATUS PATIENT QL REPORTED: ABNORMAL
FASTING STATUS PATIENT QL REPORTED: NORMAL
GLUCOSE SERPL-MCNC: 92 MG/DL (ref 70–99)
HCO3 SERPL-SCNC: 24 MMOL/L (ref 22–29)
HDLC SERPL-MCNC: 46 MG/DL
LDLC SERPL CALC-MCNC: 246 MG/DL
NONHDLC SERPL-MCNC: 304 MG/DL
POTASSIUM SERPL-SCNC: 4.2 MMOL/L (ref 3.4–5.3)
SODIUM SERPL-SCNC: 138 MMOL/L (ref 135–145)
TRIGL SERPL-MCNC: 288 MG/DL
VIT D+METAB SERPL-MCNC: 51 NG/ML (ref 20–50)

## 2024-08-28 RX ORDER — EZETIMIBE 10 MG/1
10 TABLET ORAL DAILY
Qty: 90 TABLET | Refills: 3 | Status: SHIPPED | OUTPATIENT
Start: 2024-08-28

## 2024-08-28 NOTE — TELEPHONE ENCOUNTER
General Call    Contacts       Contact Date/Time Type Contact Phone/Fax    08/28/2024 04:34 PM CDT Phone (Incoming) Renae Camacho (Self) [phone] (M)          Reason for Call:  Pt would like to speak to Dr. Miranda about her test results. Can you call pt? Thank you!    Could we send this information to you in [facility] or would you prefer to receive a phone call?:   Patient would prefer a phone call   Okay to leave a detailed message?: Yes at Home number on file [phone] (home)

## 2024-08-29 NOTE — TELEPHONE ENCOUNTER
Spoke with patient and informed her of Dr. Miranda's message below. Patient still is curious about what her blood type is and would like to know one day. I informed her that she could check with her insurance to see how much she would have to pay to have the test done.     Marielos QUINTANA CMA on August 29, 2024 at 3:13 PM

## 2024-08-29 NOTE — TELEPHONE ENCOUNTER
"Pt stated she wanted to inform Dr. Miranda that she will not be going on any medication for her cholesterol as her daughter is an herbalist and will give her some \"natural stuff.\" I relayed Dr. Miranda's message about scheduling and the patient said \"forget it\" when told that the first available is 9/25. She also stated she would like to know her blood type but that was not reported with her lab results. Please inform patient of this information.  "

## 2024-08-29 NOTE — TELEPHONE ENCOUNTER
We do not typically check blood type, in fact insurance usually will not cover it unless done for specific reasons (preop, pregnancy, acute hemorrhage). I cannot find prior blood type results in her chart to report to her.    Dr Miranda

## 2024-08-29 NOTE — TELEPHONE ENCOUNTER
Ok to wait until then to discuss results (mostly just cholesterol levels and management plan)    Dr Miranda

## 2024-10-05 ENCOUNTER — HEALTH MAINTENANCE LETTER (OUTPATIENT)
Age: 64
End: 2024-10-05

## 2024-10-08 ENCOUNTER — ANCILLARY PROCEDURE (OUTPATIENT)
Dept: MAMMOGRAPHY | Facility: CLINIC | Age: 64
End: 2024-10-08
Attending: FAMILY MEDICINE
Payer: COMMERCIAL

## 2024-10-08 DIAGNOSIS — Z12.31 SCREENING MAMMOGRAM FOR BREAST CANCER: ICD-10-CM

## 2024-10-08 PROCEDURE — 77063 BREAST TOMOSYNTHESIS BI: CPT

## 2024-10-14 ENCOUNTER — APPOINTMENT (OUTPATIENT)
Dept: CT IMAGING | Facility: CLINIC | Age: 64
End: 2024-10-14
Attending: EMERGENCY MEDICINE
Payer: COMMERCIAL

## 2024-10-14 ENCOUNTER — HOSPITAL ENCOUNTER (EMERGENCY)
Facility: CLINIC | Age: 64
Discharge: HOME | End: 2024-10-14
Attending: EMERGENCY MEDICINE | Admitting: EMERGENCY MEDICINE
Payer: COMMERCIAL

## 2024-10-14 VITALS
OXYGEN SATURATION: 98 % | DIASTOLIC BLOOD PRESSURE: 73 MMHG | HEIGHT: 65 IN | HEART RATE: 85 BPM | RESPIRATION RATE: 18 BRPM | BODY MASS INDEX: 29.16 KG/M2 | WEIGHT: 175 LBS | SYSTOLIC BLOOD PRESSURE: 154 MMHG | TEMPERATURE: 96.8 F

## 2024-10-14 DIAGNOSIS — N20.0 KIDNEY STONE: ICD-10-CM

## 2024-10-14 LAB
ALBUMIN UR-MCNC: 20 MG/DL
ANION GAP SERPL CALCULATED.3IONS-SCNC: 19 MMOL/L (ref 7–15)
APPEARANCE UR: CLEAR
BACTERIA #/AREA URNS HPF: ABNORMAL /HPF
BILIRUB UR QL STRIP: NEGATIVE
BUN SERPL-MCNC: 22.6 MG/DL (ref 8–23)
CALCIUM SERPL-MCNC: 10.5 MG/DL (ref 8.8–10.4)
CHLORIDE SERPL-SCNC: 104 MMOL/L (ref 98–107)
COLOR UR AUTO: ABNORMAL
CREAT SERPL-MCNC: 0.81 MG/DL (ref 0.51–0.95)
EGFRCR SERPLBLD CKD-EPI 2021: 82 ML/MIN/1.73M2
ERYTHROCYTE [DISTWIDTH] IN BLOOD BY AUTOMATED COUNT: 13.6 % (ref 10–15)
GLUCOSE SERPL-MCNC: 137 MG/DL (ref 70–99)
GLUCOSE UR STRIP-MCNC: NEGATIVE MG/DL
HCO3 SERPL-SCNC: 20 MMOL/L (ref 22–29)
HCT VFR BLD AUTO: 42.9 % (ref 35–47)
HGB BLD-MCNC: 14.3 G/DL (ref 11.7–15.7)
HGB UR QL STRIP: NEGATIVE
HOLD SPECIMEN: NORMAL
HOLD SPECIMEN: NORMAL
KETONES UR STRIP-MCNC: ABNORMAL MG/DL
LEUKOCYTE ESTERASE UR QL STRIP: ABNORMAL
MCH RBC QN AUTO: 28.5 PG (ref 26.5–33)
MCHC RBC AUTO-ENTMCNC: 33.3 G/DL (ref 31.5–36.5)
MCV RBC AUTO: 86 FL (ref 78–100)
MUCOUS THREADS #/AREA URNS LPF: PRESENT /LPF
NITRATE UR QL: NEGATIVE
PH UR STRIP: 6 [PH] (ref 5–7)
PLATELET # BLD AUTO: 274 10E3/UL (ref 150–450)
POTASSIUM SERPL-SCNC: 3.9 MMOL/L (ref 3.4–5.3)
RBC # BLD AUTO: 5.01 10E6/UL (ref 3.8–5.2)
RBC URINE: 4 /HPF
SODIUM SERPL-SCNC: 143 MMOL/L (ref 135–145)
SP GR UR STRIP: 1.02 (ref 1–1.03)
SQUAMOUS EPITHELIAL: 1 /HPF
UROBILINOGEN UR STRIP-MCNC: <2 MG/DL
WBC # BLD AUTO: 7.3 10E3/UL (ref 4–11)
WBC URINE: 10 /HPF

## 2024-10-14 PROCEDURE — 250N000011 HC RX IP 250 OP 636: Performed by: EMERGENCY MEDICINE

## 2024-10-14 PROCEDURE — 96376 TX/PRO/DX INJ SAME DRUG ADON: CPT | Performed by: EMERGENCY MEDICINE

## 2024-10-14 PROCEDURE — 96374 THER/PROPH/DIAG INJ IV PUSH: CPT | Mod: 59 | Performed by: EMERGENCY MEDICINE

## 2024-10-14 PROCEDURE — 80048 BASIC METABOLIC PNL TOTAL CA: CPT | Performed by: EMERGENCY MEDICINE

## 2024-10-14 PROCEDURE — 96375 TX/PRO/DX INJ NEW DRUG ADDON: CPT | Performed by: EMERGENCY MEDICINE

## 2024-10-14 PROCEDURE — 99285 EMERGENCY DEPT VISIT HI MDM: CPT | Mod: 25 | Performed by: EMERGENCY MEDICINE

## 2024-10-14 PROCEDURE — 36415 COLL VENOUS BLD VENIPUNCTURE: CPT | Performed by: EMERGENCY MEDICINE

## 2024-10-14 PROCEDURE — 81003 URINALYSIS AUTO W/O SCOPE: CPT | Performed by: EMERGENCY MEDICINE

## 2024-10-14 PROCEDURE — 74177 CT ABD & PELVIS W/CONTRAST: CPT

## 2024-10-14 PROCEDURE — 85014 HEMATOCRIT: CPT | Performed by: EMERGENCY MEDICINE

## 2024-10-14 RX ORDER — KETOROLAC TROMETHAMINE 15 MG/ML
15 INJECTION, SOLUTION INTRAMUSCULAR; INTRAVENOUS ONCE
Status: COMPLETED | OUTPATIENT
Start: 2024-10-14 | End: 2024-10-14

## 2024-10-14 RX ORDER — ONDANSETRON 2 MG/ML
4 INJECTION INTRAMUSCULAR; INTRAVENOUS ONCE
Status: COMPLETED | OUTPATIENT
Start: 2024-10-14 | End: 2024-10-14

## 2024-10-14 RX ORDER — IOPAMIDOL 755 MG/ML
100 INJECTION, SOLUTION INTRAVASCULAR ONCE
Status: COMPLETED | OUTPATIENT
Start: 2024-10-14 | End: 2024-10-14

## 2024-10-14 RX ORDER — ONDANSETRON 4 MG/1
4 TABLET, ORALLY DISINTEGRATING ORAL EVERY 8 HOURS PRN
Qty: 10 TABLET | Refills: 0 | Status: SHIPPED | OUTPATIENT
Start: 2024-10-14 | End: 2024-10-17

## 2024-10-14 RX ORDER — HYDROCODONE BITARTRATE AND ACETAMINOPHEN 5; 325 MG/1; MG/1
1 TABLET ORAL EVERY 6 HOURS PRN
Qty: 8 TABLET | Refills: 0 | Status: SHIPPED | OUTPATIENT
Start: 2024-10-14 | End: 2024-10-17

## 2024-10-14 RX ADMIN — HYDROMORPHONE HYDROCHLORIDE 1 MG: 1 INJECTION, SOLUTION INTRAMUSCULAR; INTRAVENOUS; SUBCUTANEOUS at 06:00

## 2024-10-14 RX ADMIN — KETOROLAC TROMETHAMINE 15 MG: 15 INJECTION, SOLUTION INTRAMUSCULAR; INTRAVENOUS at 06:30

## 2024-10-14 RX ADMIN — KETOROLAC TROMETHAMINE 15 MG: 15 INJECTION, SOLUTION INTRAMUSCULAR; INTRAVENOUS at 04:49

## 2024-10-14 RX ADMIN — ONDANSETRON 4 MG: 2 INJECTION INTRAMUSCULAR; INTRAVENOUS at 04:48

## 2024-10-14 RX ADMIN — ONDANSETRON 4 MG: 2 INJECTION INTRAMUSCULAR; INTRAVENOUS at 05:41

## 2024-10-14 RX ADMIN — IOPAMIDOL 100 ML: 755 INJECTION, SOLUTION INTRAVENOUS at 05:50

## 2024-10-14 ASSESSMENT — COLUMBIA-SUICIDE SEVERITY RATING SCALE - C-SSRS
2. HAVE YOU ACTUALLY HAD ANY THOUGHTS OF KILLING YOURSELF IN THE PAST MONTH?: NO
6. HAVE YOU EVER DONE ANYTHING, STARTED TO DO ANYTHING, OR PREPARED TO DO ANYTHING TO END YOUR LIFE?: NO
1. IN THE PAST MONTH, HAVE YOU WISHED YOU WERE DEAD OR WISHED YOU COULD GO TO SLEEP AND NOT WAKE UP?: NO

## 2024-10-14 ASSESSMENT — ACTIVITIES OF DAILY LIVING (ADL)
ADLS_ACUITY_SCORE: 35
ADLS_ACUITY_SCORE: 35

## 2024-10-14 NOTE — DISCHARGE INSTRUCTIONS
Your symptoms today are due to a kidney stone.  Take the prescribed medication as directed and follow-up with the kidney stone [place] as an outpatient.  Return to the emergency department for any worsening symptoms or other concerns.

## 2024-10-14 NOTE — ED PROVIDER NOTES
EMERGENCY DEPARTMENT ENCOUnter      NAME: Monica Camacho  AGE: 64 year old female  YOB: 1960  MRN: [id number]  EVALUATION DATE & TIME: 10/14/2024  4:37 AM    PCP: Gaby Miranda    ED PROVIDER: Kade Kuhn DO      Chief Complaint   Patient presents with    Flank Pain         FINAL IMPRESSION:  1. Kidney stone          ED COURSE & MEDICAL DECISION MAKING:      The patient presented to the emergency department today with complaints of sudden onset of right flank pain.  She states that this feels similar to previous kidney stones.  Laboratory testing was unremarkable but CT shows evidence of a 6 mm obstructing stone in the distal right ureter.  She is feeling better after pain and nausea medication and plan will be for discharge home with outpatient follow-up with the kidney stone [facility].  She is comfortable with this plan.    Medical Decision Making  Obtained supplemental history:Supplemental history obtained?: No  Reviewed external records: External records reviewed?: No  Care impacted by chronic illness:Documented in Chart  Did you consider but not order tests?: Work up considered but not performed and documented in chart, if applicable  Did you interpret images independently?: Independent interpretation of ECG and images noted in documentation, when applicable.  Consultation discussion with other provider:Did you involve another provider (consultant, MH, pharmacy, etc.)?: No  Discharge. I prescribed additional prescription strength medication(s) as charted. I considered admission, but discharged patient after significant clinical improvement.    MIPS: Not Applicable      At the conclusion of the encounter I discussed the results of all of the tests and the disposition. The questions were answered. The patient or family acknowledged understanding and was agreeable with the care plan.         MEDICATIONS GIVEN IN THE EMERGENCY:  Medications   ondansetron (ZOFRAN) injection 4 mg (4 mg  Intravenous $Given 10/14/24 0448)   ketorolac (TORADOL) injection 15 mg (15 mg Intravenous $Given 10/14/24 0449)   ondansetron (ZOFRAN) injection 4 mg (4 mg Intravenous $Given 10/14/24 0541)   iopamidol (ISOVUE-370) solution 100 mL (100 mLs Intravenous $Given 10/14/24 0550)   HYDROmorphone (DILAUDID) injection 1 mg (1 mg Intravenous $Given 10/14/24 0600)   ketorolac (TORADOL) injection 15 mg (15 mg Intravenous $Given 10/14/24 0630)       NEW PRESCRIPTIONS STARTED AT TODAY'S ER VISIT  New Prescriptions    HYDROCODONE-ACETAMINOPHEN (NORCO) 5-325 MG TABLET    Take 1 tablet by mouth every 6 hours as needed for pain.    ONDANSETRON (ZOFRAN ODT) 4 MG ODT TAB    Take 1 tablet (4 mg) by mouth every 8 hours as needed for nausea.          =================================================================    HPI        Monica Camacho is a 64 year old female with a pertinent history of kidney stones who presents to the emergency department today with complaints of right sided flank pain.  She states that this began suddenly around 3 AM.  She was feeling fine prior to this.  She has been unable to get comfortable since the pain started.  She has had some nausea with this as well and states that this feels similar to previous kidney stones.  Her last stone was over 25 years ago.  She states that she feels the urge to urinate but has not been able to.  No other current complaints.          PAST MEDICAL HISTORY:  Past Medical History:   Diagnosis Date    Anxiety     Cold sore        PAST SURGICAL HISTORY:  Past Surgical History:   Procedure Laterality Date    BIOPSY BREAST Right     benign     SECTION             CURRENT MEDICATIONS:    HYDROcodone-acetaminophen (NORCO) 5-325 MG tablet  ondansetron (ZOFRAN ODT) 4 MG ODT tab  ALPRAZolam (XANAX) 0.25 MG tablet  calcium-vitamin D (CALCIUM-VITAMIN D) 500 mg(1,250mg) -200 unit per tablet  cholecalciferol, vitamin D3, (VITAMIN D3) 2,000 unit cap  cyanocobalamin (VITAMIN  B-12) 100 MCG tablet  ezetimibe (ZETIA) 10 MG tablet  multivitamin capsule  ondansetron (ZOFRAN) 4 MG tablet  PARoxetine (PAXIL) 10 MG tablet        ALLERGIES:  Allergies   Allergen Reactions    Amoxicillin Unknown     Sinus infex on day 7/10 developed rash/hives    Penicillins Unknown     Sinus infex on day 7/10 developed rash/hives       FAMILY HISTORY:  Family History   Problem Relation Age of Onset    Colon Cancer Mother 58    Hyperlipidemia Mother     Hyperlipidemia Father     Hypertension Father     Cancer Father         skin cancer, not melanoma    No Known Problems Sister     No Known Problems Sister     Breast Cancer Maternal Grandmother 74    No Known Problems Maternal Grandfather     Alzheimer Disease Paternal Grandmother     No Known Problems Paternal Grandfather     No Known Problems Maternal Aunt     No Known Problems Paternal Aunt     Breast Cancer Other 54        Mat Cousin    Hereditary Breast and Ovarian Cancer Syndrome No family hx of     Endometrial Cancer No family hx of     Ovarian Cancer No family hx of        SOCIAL HISTORY:   Social History     Socioeconomic History    Marital status:    Tobacco Use    Smoking status: Never    Smokeless tobacco: Never   Vaping Use    Vaping status: Never Used   Substance and Sexual Activity    Alcohol use: Yes     Alcohol/week: 1.0 standard drink of alcohol    Drug use: No    Sexual activity: Yes     Partners: Male     Birth control/protection: Post-menopausal     Social Determinants of Health     Financial Resource Strain: Low Risk  (8/22/2024)    Financial Resource Strain     Within the past 12 months, have you or your family members you live with been unable to get utilities (heat, electricity) when it was really needed?: No   Food Insecurity: Low Risk  (8/22/2024)    Food Insecurity     Within the past 12 months, did you worry that your food would run out before you got money to buy more?: No     Within the past 12 months, did the food you bought  "just not last and you didn t have money to get more?: No   Transportation Needs: Low Risk  (8/22/2024)    Transportation Needs     Within the past 12 months, has lack of transportation kept you from medical appointments, getting your medicines, non-medical meetings or appointments, work, or from getting things that you need?: No   Physical Activity: Sufficiently Active (8/22/2024)    Exercise Vital Sign     Days of Exercise per Week: 5 days     Minutes of Exercise per Session: 30 min   Stress: No Stress Concern Present (8/22/2024)    [place] [employer] - Occupational Stress Questionnaire     Feeling of Stress : Not at all   Social Connections: Unknown (8/22/2024)    Social Connection and Isolation Panel [NHANES]     Frequency of Social Gatherings with Friends and Family: Once a week   Housing Stability: Low Risk  (8/22/2024)    Housing Stability     Do you have housing? : Yes     Are you worried about losing your housing?: No       VITALS:  Patient Vitals for the past 24 hrs:   BP Temp Temp src Pulse Resp SpO2 Height Weight   10/14/24 0615 (!) 158/75 -- -- 87 20 98 % -- --   10/14/24 0603 (!) 162/74 -- -- 90 20 100 % 1.651 m (5' 5\") 79.4 kg (175 lb)   10/14/24 0448 -- 96.8  F (36  C) Temporal -- -- -- -- --   10/14/24 0439 (!) 171/89 -- -- 74 25 100 % -- --       PHYSICAL EXAM    Constitutional:  Well developed, Well nourished, appears uncomfortable  HENT:  Normocephalic, Atraumatic, Oropharynx moist, Nose normal.   Eyes:  EOMI, Conjunctiva normal, No discharge.   Respiratory:  Normal breath sounds, No respiratory distress, No wheezing, No chest tenderness.   Cardiovascular:  Normal heart rate, Normal rhythm, No murmurs  GI:  Soft, No tenderness, No guarding, mild right CVA tenderness  Musculoskeletal:  No tenderness to palpation or major deformities noted.   Extremities: No lower extremity edema.  Neurologic:  Alert & oriented x 3, No focal deficits noted.   Psychiatric:  Affect normal, " Judgment normal, Mood normal.        LAB:  All pertinent labs reviewed and interpreted.  Results for orders placed or performed during the hospital encounter of 10/14/24              UA with Microscopic reflex to Culture    Specimen: Urine, Clean Catch   Result Value Ref Range    Color Urine Light Yellow Colorless, Straw, Light Yellow, Yellow    Appearance Urine Clear Clear    Glucose Urine Negative Negative mg/dL    Bilirubin Urine Negative Negative    Ketones Urine Trace (A) Negative mg/dL    Specific Gravity Urine 1.021 1.001 - 1.030    Blood Urine Negative Negative    pH Urine 6.0 5.0 - 7.0    Protein Albumin Urine 20 (A) Negative mg/dL    Urobilinogen Urine <2.0 <2.0 mg/dL    Nitrite Urine Negative Negative    Leukocyte Esterase Urine 75 Alexander/uL (A) Negative    Bacteria Urine Few (A) None Seen /HPF    Mucus Urine Present (A) None Seen /LPF    RBC Urine 4 (H) <=2 /HPF    WBC Urine 10 (H) <=5 /HPF    Squamous Epithelials Urine 1 <=1 /HPF   Extra Green Top (Lithium Heparin) Tube   Result Value Ref Range    Hold Specimen JIC    Extra Purple Top Tube   Result Value Ref Range    Hold Specimen JIC    CBC with platelets   Result Value Ref Range    WBC Count 7.3 4.0 - 11.0 10e3/uL    RBC Count 5.01 3.80 - 5.20 10e6/uL    Hemoglobin 14.3 11.7 - 15.7 g/dL    Hematocrit 42.9 35.0 - 47.0 %    MCV 86 78 - 100 fL    MCH 28.5 26.5 - 33.0 pg    MCHC 33.3 31.5 - 36.5 g/dL    RDW 13.6 10.0 - 15.0 %    Platelet Count 274 150 - 450 10e3/uL   Basic metabolic panel   Result Value Ref Range    Sodium 143 135 - 145 mmol/L    Potassium 3.9 3.4 - 5.3 mmol/L    Chloride 104 98 - 107 mmol/L    Carbon Dioxide (CO2) 20 (L) 22 - 29 mmol/L    Anion Gap 19 (H) 7 - 15 mmol/L    Urea Nitrogen 22.6 8.0 - 23.0 mg/dL    Creatinine 0.81 0.51 - 0.95 mg/dL    GFR Estimate 82 >60 mL/min/1.73m2    Calcium 10.5 (H) 8.8 - 10.4 mg/dL    Glucose 137 (H) 70 - 99 mg/dL       RADIOLOGY:  I have independently reviewed and interpreted the above imaging, pending  the final radiology read.  CT Abdomen Pelvis w Contrast   Final Result   IMPRESSION:    1.  Right hydroureteronephrosis secondary to a 6 mm stone in the distal right ureter.      2.  Multiple calcified intrarenal stones in both kidneys.      3.  Hepatomegaly and diffuse steatosis.            Kade Kuhn DO  Emergency Medicine  [facility] EMERGENCY ROOM  [address] [facility] [address]  [phone]  Dept: [phone]     Kade Kuhn DO  10/21/24 1524

## 2024-10-14 NOTE — ED TRIAGE NOTES
Pt with sudden onset Rt flank pain around 0300; feelings of urinary urgency but unable to urinate. H/o kidney stones and this feels like that. No pain meds PTA.     Triage Assessment (Adult)       Row Name 10/14/24 0441          Triage Assessment    Airway WDL WDL        Respiratory WDL    Respiratory WDL WDL        Skin Circulation/Temperature WDL    Skin Circulation/Temperature WDL WDL        Cardiac WDL    Cardiac WDL WDL        Peripheral/Neurovascular WDL    Peripheral Neurovascular WDL WDL        Cognitive/Neuro/Behavioral WDL    Cognitive/Neuro/Behavioral WDL WDL

## 2024-10-15 ENCOUNTER — VIRTUAL VISIT (OUTPATIENT)
Dept: UROLOGY | Facility: CLINIC | Age: 64
End: 2024-10-15
Payer: COMMERCIAL

## 2024-10-15 ENCOUNTER — TELEPHONE (OUTPATIENT)
Dept: UROLOGY | Facility: CLINIC | Age: 64
End: 2024-10-15

## 2024-10-15 ENCOUNTER — PATIENT OUTREACH (OUTPATIENT)
Dept: FAMILY MEDICINE | Facility: CLINIC | Age: 64
End: 2024-10-15
Payer: COMMERCIAL

## 2024-10-15 DIAGNOSIS — N20.0 NEPHROLITHIASIS: Primary | ICD-10-CM

## 2024-10-15 DIAGNOSIS — N20.1 RIGHT URETERAL CALCULUS: ICD-10-CM

## 2024-10-15 DIAGNOSIS — N20.0 NEPHROLITHIASIS: ICD-10-CM

## 2024-10-15 PROCEDURE — 99203 OFFICE O/P NEW LOW 30 MIN: CPT | Mod: 95 | Performed by: NURSE PRACTITIONER

## 2024-10-15 RX ORDER — TAMSULOSIN HYDROCHLORIDE 0.4 MG/1
0.4 CAPSULE ORAL DAILY
Qty: 30 CAPSULE | Refills: 0 | Status: SHIPPED | OUTPATIENT
Start: 2024-10-15 | End: 2024-11-08

## 2024-10-15 RX ORDER — TAMSULOSIN HYDROCHLORIDE 0.4 MG/1
0.4 CAPSULE ORAL DAILY
Qty: 90 CAPSULE | OUTPATIENT
Start: 2024-10-15

## 2024-10-15 ASSESSMENT — PAIN SCALES - GENERAL: PAINLEVEL: MILD PAIN (2)

## 2024-10-15 NOTE — PATIENT INSTRUCTIONS
UROLOGY CLINIC VISIT PATIENT INSTRUCTIONS    -If having severe flank pain, fevers, chills, nausea, or vomiting please notify Urology clinic or be seen in the ER.   Please refer to the following link for information about Dr. Johnson and to learn about the ureteroscopy procedure:    [email or web address]  [email or web address]        If you have any issues, questions or concerns in the meantime, do not hesitate to contact us at [facility] at [phone] or via RealPage.     Stefani Astudillo CNP  Department of Urology     Medicines to Control Your Kidney Stone Symptoms    Control Pain: First Line Treatment    Dramamine (Please use the drowsy version, nongeneric formulation)  Available over the counter  **This medicine will cause increased drowsiness. DON T DRIVE OR OPERATE MACHINERY FOR 6 HOURS**    How to take:   Take 50 mg at bedtime every night until the stone passes  In addition, take 50 mg every 6 hours as needed    What it does:  Decreases spasm of the ureter  Decreases recurrence of pain for next 24 hours  Decreases severe pain  Decreases nausea  Will help you sleep    Ibuprofen (Advil or Motrin)  Available over the counter  **Please do not take if advise to avoid NSAIDS, history of stomach ulcers/bleeding issues, blood thinners, or already on NSAIDS**    How to take:   Take 2 to 4 (200 mg) tablets every 6 hours for the first 48 hours. After that, use only as needed    What it does:  Decreases pain  Prevents spasm of the ureter    Acetaminophen (Tylenol)   Available over the counter    How to Take:  Take 2 (500 mg) tablets every 6 hours as needed. Do not exceed 8 tablets (4,000 mg total) in 24 hours    What it does:  Highly effective in controlling pain      Control pain: second line treatment (if you still have severe pain 1 hour after trying all of the above)    Narcotics (Norco)     How to take   Take 1 to 2 tablets every 6 hours as needed    Narcotics  have major side effects:   Confusion, disorientation and sleepiness. DO NOT DRIVE OR OPERATE MACHINERY WITHIN 24 HOURS.   Nausea. Take Dramamine, Zofran or Haldol to help control this.   May cause constipation (hard, dry stools). Start over-the-counter Miralax (1/2 to 1 capful) as needed if experiencing constipation.   Trouble sleeping    Other medicines we may give you:    Tamsulosin (Flomax): Take 0.4 mg daily with food     What it does:   May decrease stone pain   May help stones pass faster   May make surgery more successful by improving access to stone   May decrease discomfort from ureteral stent, if used    Possible side effects:   Lightheadedness when standing too quickly (especially in older people)   Stuffy nose

## 2024-10-15 NOTE — NURSING NOTE
Current patient location: [address] [place] [id number]    Is the patient currently in the state of [place]? YES    Visit mode:VIDEO    If the visit is dropped, the patient can be reconnected by: VIDEO VISIT: Text to cell phone:   Telephone Information:   Mobile [phone]       Will anyone else be joining the visit? NO  (If patient encounters technical issues they should call [phone])    Are changes needed to the allergy or medication list? No    Are refills needed on medications prescribed by this physician? NO    Rooming Documentation:  Not applicable    Reason for visit: Consult (NEW KIDNEY STONE )    Anna LOWERY

## 2024-10-15 NOTE — PROGRESS NOTES
Urology Video Office Visit    Video-Visit Details    Type of service:  Video Visit    Video Start Time: 1402    Video End Time: 1436    Originating Location (pt. Location): Home    Distant Location (provider location):  Off-site     Platform used for Video Visit: [employer]           Assessment and Plan:     Assessment:64 year old female with a right 6mm distal ureteral stone and bilateral nonobstructing renal stones.    Plan:  -Reviewed CT scan with patient. Noted right 6mm distal ureteral stone with multiple right nonobstructing stones and two left nonobstructing renal stones.   -Discussed option of continue with MET x 3-4 weeks vs a definitive stone procedure. She would like a definitive stone procedure at this time for her right ureteral stone and also to clear her left kidney of stones.   -The patient and I discussed the diagnosis and natural history of urolithiasis. We also discussed the need for metabolic evaluation to determine risk factors for recurrent stones. Would recommend 24 hour urine study after acute stone episode.   -We discussed treatment options of a ESWL vs ureteroscopy and laser lithotripsy. I counseled the patient regarding the potential need for a ureteral stent after treatment and the necessity of removing the stent after surgery. After discussing risks and benefits of a ureteroscopy, the patient elects to proceed with right URS/LL and is open to a bilateral URS/LL if appropriate.  -Please continue with acetaminophen, ibuprofen, dimenhydrinate, and Norco PRN for pain control.  Recommend to start on tamsulosin 0.4mg PO to help with stone passage.   -If having severe flank pain, fevers, chills, nausea, or vomiting please notify Urology clinic or be seen in the ER.     Stefani Astudillo CNP  Department of Urology  October 15, 2024    I spent a total of  35 minutes spent on the date of the encounter doing chart review, history and exam, documentation, and further activities as noted above.           Chief Complaint:   Right Ureteral Stone         History of Present Illness:    Monica Camacho is a pleasant 64 year old female who presents with concerns of a right ureteral. PMHx: Anxiety    Ms. Camacho was seen in the ER on 10/14/24 for concerns of right flank pain.     CT scan on 10/14/24 (images personally reviewed) revealed a right distal 6mm x 4mm distal ureteral stone. Noted two adjacent right lower pole stones measuring 4mm in size and a 3mm upper pole stone. Noted two left nonobstructing renal stone measuring 4mm and 6mm in size. No noted left hydronephrosis.     She is doing well at this time. Denies any flank pain, f/c/n/v, gross hematuria, or dysuria. She does not feelings of urinary frequency/urgency.     Her first stone was in  with her 1st pregnancy;  was her second stone episode during her 2nd pregnancy. She had another stone episode which did require a definitive stone procedure with post-op ureteral stent placement.     Stone Risk Factors: None    She was taking Vitamin C, Vitamin D, and Calcium with Vitamin D supplements.          Past Medical History:     Past Medical History:   Diagnosis Date    Anxiety     Cold sore             Past Surgical History:     Past Surgical History:   Procedure Laterality Date    BIOPSY BREAST Right     benign     SECTION              Medications     Current Outpatient Medications   Medication Sig Dispense Refill    ALPRAZolam (XANAX) 0.25 MG tablet TAKE 1 TABLET BY MOUTH EVERY 8 HOURS AS NEEDED 30 tablet 0    calcium-vitamin D (CALCIUM-VITAMIN D) 500 mg(1,250mg) -200 unit per tablet [CALCIUM-VITAMIN D (CALCIUM-VITAMIN D) 500 MG(1,250MG) -200 UNIT PER TABLET] Take 1 tablet by mouth 2 (two) times a day with meals.      cholecalciferol, vitamin D3, (VITAMIN D3) 2,000 unit cap [CHOLECALCIFEROL, VITAMIN D3, (VITAMIN D3) 2,000 UNIT CAP] Take by mouth.      cyanocobalamin (VITAMIN B-12) 100 MCG tablet Take 100 mcg by mouth daily      ezetimibe  (ZETIA) 10 MG tablet Take 1 tablet (10 mg) by mouth daily. 90 tablet 3    HYDROcodone-acetaminophen (NORCO) 5-325 MG tablet Take 1 tablet by mouth every 6 hours as needed for pain. 8 tablet 0    multivitamin capsule [MULTIVITAMIN CAPSULE] Take 1 capsule by mouth daily.      ondansetron (ZOFRAN ODT) 4 MG ODT tab Take 1 tablet (4 mg) by mouth every 8 hours as needed for nausea. 10 tablet 0    ondansetron (ZOFRAN) 4 MG tablet Take 1 tablet (4 mg) by mouth every 8 hours as needed for nausea. 20 tablet 1    PARoxetine (PAXIL) 10 MG tablet Take 1 tablet (10 mg) by mouth every morning. 90 tablet 0     No current facility-administered medications for this visit.            Family History:     Family History   Problem Relation Age of Onset    Colon Cancer Mother 58    Hyperlipidemia Mother     Hyperlipidemia Father     Hypertension Father     Cancer Father         skin cancer, not melanoma    No Known Problems Sister     No Known Problems Sister     Breast Cancer Maternal Grandmother 74    No Known Problems Maternal Grandfather     Alzheimer Disease Paternal Grandmother     No Known Problems Paternal Grandfather     No Known Problems Maternal Aunt     No Known Problems Paternal Aunt     Breast Cancer Other 54        Mat Cousin    Hereditary Breast and Ovarian Cancer Syndrome No family hx of     Endometrial Cancer No family hx of     Ovarian Cancer No family hx of             Social History:     Social History     Socioeconomic History    Marital status:      Spouse name: Not on file    Number of children: Not on file    Years of education: Not on file    Highest education level: Not on file   Occupational History    Not on file   Tobacco Use    Smoking status: Never    Smokeless tobacco: Never   Vaping Use    Vaping status: Never Used   Substance and Sexual Activity    Alcohol use: Yes     Alcohol/week: 1.0 standard drink of alcohol    Drug use: No    Sexual activity: Yes     Partners: Male     Birth  control/protection: Post-menopausal   Other Topics Concern    Not on file   Social History Narrative    Not on file     Social Determinants of Health     Financial Resource Strain: Low Risk  (8/22/2024)    Financial Resource Strain     Within the past 12 months, have you or your family members you live with been unable to get utilities (heat, electricity) when it was really needed?: No   Food Insecurity: Low Risk  (8/22/2024)    Food Insecurity     Within the past 12 months, did you worry that your food would run out before you got money to buy more?: No     Within the past 12 months, did the food you bought just not last and you didn t have money to get more?: No   Transportation Needs: Low Risk  (8/22/2024)    Transportation Needs     Within the past 12 months, has lack of transportation kept you from medical appointments, getting your medicines, non-medical meetings or appointments, work, or from getting things that you need?: No   Physical Activity: Sufficiently Active (8/22/2024)    Exercise Vital Sign     Days of Exercise per Week: 5 days     Minutes of Exercise per Session: 30 min   Stress: No Stress Concern Present (8/22/2024)    [place] [employer] - Occupational Stress Questionnaire     Feeling of Stress : Not at all   Social Connections: Unknown (8/22/2024)    Social Connection and Isolation Panel [NHANES]     Frequency of Communication with Friends and Family: Not on file     Frequency of Social Gatherings with Friends and Family: Once a week     Attends Protestant Services: Not on file     Active Member of Clubs or Organizations: Not on file     Attends Club or Organization Meetings: Not on file     Marital Status: Not on file   Interpersonal Safety: Not on file   Housing Stability: Low Risk  (8/22/2024)    Housing Stability     Do you have housing? : Yes     Are you worried about losing your housing?: No            Allergies:   Amoxicillin and Penicillins         Review of Systems:   From intake questionnaire   Negative 14 system review except as noted on HPI, nurse's note.         Physical Exam:   General Appearance: Well groomed, hygenic  Eyes: No redness, discharge  Respiratory: No cough, no respiratory distress or labored breathing  Musculoskeletal: Grossly normal, full range of motion in upper extremities, no gross deficits  Skin: No discoloration or apparent rashes  Neurologic - No tremors  Psychiatric - Alert and oriented  The rest of a comprehensive physical examination is deferred due to video visit restrictions        Labs:    I personally reviewed all applicable laboratory data and went over findings with patient  Significant for:    CBC RESULTS:  Recent Labs   Lab Test 10/14/24  0444 08/27/24  1405 06/02/23  1402   WBC 7.3 5.8 5.8   HGB 14.3 14.2 14.3    241 242        BMP RESULTS:  Recent Labs   Lab Test 10/14/24  0444 08/27/24  1405 06/02/23  1402 03/21/22  1057 03/15/21  1215 02/17/20  1044 02/11/19  1036 02/05/18  0819    138 141 139 142 140 142 141   POTASSIUM 3.9 4.2 3.6 4.1 4.5 4.2 4.3 4.4   CHLORIDE 104 101 103 104 106 105 107 107   CO2 20* 24 25 22 26 25 26 25   ANIONGAP 19* 13 13 13 10 10 9 9   * 92 121* 98 92 97 98 100   BUN 22.6 16.9 16.4 18 16 14 18 19   CR 0.81 0.66 0.71 0.72 0.73 0.75 0.72 0.66   GFRESTIMATED 82 >90 >90 >90 >60 >60 >60 >60   GFRESTBLACK  --   --   --   --  >60 >60 >60 >60   JACOB 10.5* 9.9 9.8 9.6 9.4 10.1 10.0 9.1       UA RESULTS:   Recent Labs   Lab Test 10/14/24  0522   SG 1.021   URINEPH 6.0   NITRITE Negative   RBCU 4*   WBCU 10*       CALCIUM RESULTS  Lab Results   Component Value Date    JACOB 10.5 10/14/2024    JACOB 9.9 08/27/2024    JACOB 9.8 06/02/2023           Imaging:    I personally reviewed all applicable imaging and went over the below findings with patient.    Results for orders placed or performed during the hospital encounter of 10/14/24   CT Abdomen Pelvis w Contrast    Narrative    EXAM: CT ABDOMEN PELVIS W  CONTRAST  LOCATION: [facility]  DATE: 10/14/2024    INDICATION: R flank pain  COMPARISON: None.  TECHNIQUE: CT scan of the abdomen and pelvis was performed following injection of IV contrast. Multiplanar reformats were obtained. Dose reduction techniques were used.  CONTRAST: 100ml Isovue 370    FINDINGS:   LOWER CHEST: Normal.    HEPATOBILIARY: Mild, diffuse hepatic steatosis. The right liver lobe measures 23 cm craniocaudal. Gallbladder is normal.    PANCREAS: Normal.    SPLEEN: Normal.    ADRENAL GLANDS: Normal.    KIDNEYS/BLADDER: Multiple left renal stones measuring up to 6 mm in the upper pole. There is mild right hydroureteronephrosis with delayed corticomedullary excretion of contrast secondary to a 6 x 4 x 6 mm stone in the distal left ureter less than 1 cm   from the ureterovesicular junction. There is an additional 3 mm right midpole intrarenal stone and 2 adjacent lower pole stones measuring up to 4 mm in the right kidney. The bladder is normal.    BOWEL: Normal.    LYMPH NODES: Normal.    VASCULATURE: Mildly atherosclerotic abdominal aorta without aneurysm.    PELVIC ORGANS: Enhancing uterine fundal fibroid measuring 13 mm.    MUSCULOSKELETAL: Moderate to severe degenerative disc and facet change of the lumbar spine.      Impression    IMPRESSION:   1.  Right hydroureteronephrosis secondary to a 6 mm stone in the distal right ureter.    2.  Multiple calcified intrarenal stones in both kidneys.    3.  Hepatomegaly and diffuse steatosis.

## 2024-10-15 NOTE — TELEPHONE ENCOUNTER
Transitions of Care Outreach  Chief Complaint   Patient presents with    Hospital F/U       Most Recent Admission Date: 10/14/2024   Most Recent Admission Diagnosis:      Most Recent Discharge Date: 10/14/2024   Most Recent Discharge Diagnosis: Kidney stone - N20.0     Transitions of Care Assessment    Discharge Assessment  How are you doing now that you are home?: pain is improved, has not passed stone yet  How are your symptoms? (Red Flag symptoms escalate to triage hotline per guidelines): Improved  Do you know how to contact your clinic care team if you have future questions or changes to your health status? : Yes  Does the patient have their discharge instructions? : Yes  Does the patient have questions regarding their discharge instructions? : No  Were you started on any new medications or were there changes to any of your previous medications? : Yes  Does the patient have all of their medications?: Yes  Do you have questions regarding any of your medications? : No    Follow up Plan     Discharge Follow-Up  Discharge follow up appointment scheduled in alignment with recommended follow up timeframe or Transitions of Risk Category? (Low = within 30 days; Moderate= within 14 days; High= within 7 days): Yes  Discharge Follow Up Appointment Scheduled with?: Specialty Care Provider    No future appointments.    Outpatient Plan as outlined on AVS reviewed with patient.    For any urgent concerns, please contact our 24 hour nurse triage line: [phone] ([phone])       Mckenzie Ruiz RN

## 2024-10-15 NOTE — TELEPHONE ENCOUNTER
[facility] Call Center    Phone Message    May a detailed message be left on voicemail: yes     Reason for Call: Other: Pt is not sure why she is needing to be seen for urology. Pt isnt sure if she's supposed to wait for her to pass or was she needing a surgical consult. Pt would like to discuss before scheduling. Thanks      Action Taken: Other: uro    Travel Screening: Not Applicable     Date of Service:

## 2024-10-16 ENCOUNTER — TELEPHONE (OUTPATIENT)
Dept: UROLOGY | Facility: CLINIC | Age: 64
End: 2024-10-16
Payer: COMMERCIAL

## 2024-10-16 PROBLEM — N20.1 RIGHT URETERAL CALCULUS: Status: ACTIVE | Noted: 2024-10-15

## 2024-10-16 PROBLEM — N20.0 NEPHROLITHIASIS: Status: ACTIVE | Noted: 2024-10-15

## 2024-10-16 NOTE — TELEPHONE ENCOUNTER
Patient rescheduled to November 8 2024 at [facility]  patient requested to have BL ureteroscopy   Ordered changed         Spoke with: Patient       Date of surgery: Friday November 1 with Dr Johnson      Location: [facility]      Informed patient they will need a adult : YES      Pre op with provider: Patient will schedule with SEN Elena       H&P Scheduled in PAC- NA        Pre procedure covid : Not req      Additional imaging: NA        Surgery Packet : Sent via [employer]       Additional comments: Please call for surgery teaching

## 2024-10-17 ENCOUNTER — MYC MEDICAL ADVICE (OUTPATIENT)
Dept: UROLOGY | Facility: CLINIC | Age: 64
End: 2024-10-17
Payer: COMMERCIAL

## 2024-10-17 ENCOUNTER — PREP FOR PROCEDURE (OUTPATIENT)
Dept: UROLOGY | Facility: CLINIC | Age: 64
End: 2024-10-17
Payer: COMMERCIAL

## 2024-10-19 NOTE — TELEPHONE ENCOUNTER
Last night kidney stone passed, she was prescribed Tamsulosin , and she is out and wondering if she can stop taking it she does not like how it makes her feel. She is also taking paroxitine and noticed that she should not be taking the Tamsulosin. Would like a call back [phone]

## 2024-10-21 ENCOUNTER — TELEPHONE (OUTPATIENT)
Dept: UROLOGY | Facility: CLINIC | Age: 64
End: 2024-10-21
Payer: COMMERCIAL

## 2024-10-21 DIAGNOSIS — N20.0 NEPHROLITHIASIS: Primary | ICD-10-CM

## 2024-10-21 NOTE — TELEPHONE ENCOUNTER
Writer called patient to discuss providers recommendations. Patient stopped taking medication over weekend but will call urology to verify she no longer needs the medication.    ARCADIO FletcherN, RN  [facility]

## 2024-10-21 NOTE — TELEPHONE ENCOUNTER
[facility] Call Center    Phone Message    May a detailed message be left on voicemail: yes     Reason for Call: Other: pt calling thinks she is having a medication reaction or not sure, was reading and says you shouldn't take a certain med w it, please call pt   pt is not sure what is going on     Action Taken: Other: urology    Travel Screening: Not Applicable     Date of Service:

## 2024-10-21 NOTE — TELEPHONE ENCOUNTER
Spoke with patient who states she talked with a pharmacist at [facility] who states that there can be a drug interaction between paroxetine and tamsulosin, however this is not life threatening. Pt does elect to discontinue the tamsulosin d/t increased psychological side effects.     Ordered urine culture for pre-op.     VEENA Santos  Care Coordinator- Urology   [phone]

## 2024-10-29 ENCOUNTER — OFFICE VISIT (OUTPATIENT)
Dept: FAMILY MEDICINE | Facility: CLINIC | Age: 64
End: 2024-10-29
Payer: COMMERCIAL

## 2024-10-29 VITALS
SYSTOLIC BLOOD PRESSURE: 110 MMHG | BODY MASS INDEX: 29.59 KG/M2 | HEART RATE: 76 BPM | RESPIRATION RATE: 16 BRPM | OXYGEN SATURATION: 98 % | WEIGHT: 177.6 LBS | TEMPERATURE: 98.5 F | DIASTOLIC BLOOD PRESSURE: 72 MMHG | HEIGHT: 65 IN

## 2024-10-29 DIAGNOSIS — E78.5 DYSLIPIDEMIA: ICD-10-CM

## 2024-10-29 DIAGNOSIS — F41.9 ANXIETY: ICD-10-CM

## 2024-10-29 DIAGNOSIS — N20.1 RIGHT URETERAL CALCULUS: ICD-10-CM

## 2024-10-29 DIAGNOSIS — Z01.818 PRE-OP EXAMINATION: Primary | ICD-10-CM

## 2024-10-29 PROCEDURE — 87086 URINE CULTURE/COLONY COUNT: CPT

## 2024-10-29 PROCEDURE — 87088 URINE BACTERIA CULTURE: CPT

## 2024-10-29 PROCEDURE — 99214 OFFICE O/P EST MOD 30 MIN: CPT

## 2024-10-29 PROCEDURE — 87076 CULTURE ANAEROBE IDENT EACH: CPT

## 2024-10-29 ASSESSMENT — PAIN SCALES - GENERAL: PAINLEVEL_OUTOF10: NO PAIN (0)

## 2024-10-29 NOTE — PROGRESS NOTES
Preoperative Evaluation  [facility]  [address]  [facility] [address]  Phone: [phone]  Fax: [phone]  Primary Provider: Gaby Miranda MD  Pre-op Performing Provider: MCKENZIE Garcia CNP  Oct 29, 2024             10/29/2024   Surgical Information   What procedure is being done? Removal of kidney stones from both kidneys    Facility or Hospital where procedure/surgery will be performed: [facility]    Who is doing the procedure / surgery? Dr Johnson    Date of surgery / procedure: November 8th    Time of surgery / procedure: 11:00am    Where do you plan to recover after surgery? at home with family        Patient-reported     Fax number for surgical facility: Note does not need to be faxed, will be available electronically in Epic.    Assessment & Plan     The proposed surgical procedure is considered INTERMEDIATE risk.    Pre-op examination  Scheduled 11/8/2024 for cystoscopy, right ureteroscopy, right holmium, laser lithotripsy, right retrograde pyelogram, possible right ureteral stent placement with Dr. Jose Enrique Johnson.  No prior complication with sedation or anesthesia.  Allergies include penicillins (rash/hives).      Right ureteral calculus  Passed kidney stone 10/19/2024.  Taking tamsulosin 0.4 mg daily, continue without modification.   10/14/2024 CT abdomen/pelvis   KIDNEYS/BLADDER: Multiple left renal stones measuring up to 6 mm in the upper pole. There is mild right hydroureteronephrosis with delayed corticomedullary excretion of contrast secondary to a 6 x 4 x 6 mm stone in the distal left ureter less than 1 cm   from the ureterovesicular junction. There is an additional 3 mm right midpole intrarenal stone and 2 adjacent lower pole stones measuring up to 4 mm in the right kidney. The bladder is normal.    Dyslipidemia  8/2024 .  Stated taking ezetimibe 10 mg daily after lipid panel results.  Has not had follow  up lipid panel.      Anxiety  Takes paroxetine 10 mg daily and alprazolam 0.25 mg as needed, continue without modification.       - No identified additional risk factors other than previously addressed    Antiplatelet or Anticoagulation Medication Instructions   - Patient is on no antiplatelet or anticoagulation medications.    Additional Medication Instructions  Take all scheduled medications on the day of surgery    Recommendation  Approval given to proceed with proposed procedure, without further diagnostic evaluation.    Saige Macdonald is a 64 year old, presenting for the following:  Pre-Op Exam          10/29/2024     1:47 PM   Additional Questions   Roomed by KERA Bautista related to upcoming procedure:     Patient is a 64-year old female presenting for pre operative exam.  No prior complication with sedation or anesthesia.  Allergies include penicillins (rash/hives).  Scheduled 11/8/2024 for cystoscopy, right ureteroscopy, right holmium, laser lithotripsy, right retrograde pyelogram, possible right ureteral stent placement with Dr. Jose Enrique Johnson.  Medical history includes nephrolithiasis, dyslipidemia, anxiety.    10/14/2024 CT abdomen/pelvis   KIDNEYS/BLADDER: Multiple left renal stones measuring up to 6 mm in the upper pole. There is mild right hydroureteronephrosis with delayed corticomedullary excretion of contrast secondary to a 6 x 4 x 6 mm stone in the distal left ureter less than 1 cm   from the ureterovesicular junction. There is an additional 3 mm right midpole intrarenal stone and 2 adjacent lower pole stones measuring up to 4 mm in the right kidney. The bladder is normal.    Started on tamsulosin 0.4 mg daily.  Did pass one kidney stone 10/19/2024, this kidney stone was the one causing pain.  Has not needed to take any pain medications since.  Denies fever, chills, flank pain, nausea, vomiting, hematuria, dysuria.          10/29/2024   Pre-Op Questionnaire   Have you ever had a heart  attack or stroke? No    Have you ever had surgery on your heart or blood vessels, such as a stent placement, a coronary artery bypass, or surgery on an artery in your head, neck, heart, or legs? No    Do you have chest pain with activity? No    Do you have a history of heart failure? No    Do you currently have a cold, bronchitis or symptoms of other infection? No    Do you have a cough, shortness of breath, or wheezing? No    Do you or anyone in your family have previous history of blood clots? No    Do you or does anyone in your family have a serious bleeding problem such as prolonged bleeding following surgeries or cuts? No    Have you ever had problems with anemia or been told to take iron pills? No    Have you had any abnormal blood loss such as black, tarry or bloody stools, or abnormal vaginal bleeding? No    Have you ever had a blood transfusion? No    Are you willing to have a blood transfusion if it is medically needed before, during, or after your surgery? Yes    Have you or any of your relatives ever had problems with anesthesia? No    Do you have sleep apnea, excessive snoring or daytime drowsiness? No    Do you have any artifical heart valves or other implanted medical devices like a pacemaker, defibrillator, or continuous glucose monitor? No    Do you have artificial joints? No    Are you allergic to latex? No        Patient-reported     Health Care Directive  Patient does not have a Health Care Directive: Discussed advance care planning with patient; however, patient declined at this time.    Preoperative Review of    reviewed - controlled substances reflected in medication list.      Patient Active Problem List    Diagnosis Date Noted    Right ureteral calculus 10/15/2024     Priority: Medium    Nephrolithiasis 10/15/2024     Priority: Medium    Polyp of colon 12/12/2020     Priority: Medium    Dyslipidemia      Priority: Medium     Created by Conversion        Anxiety      Priority: Medium      Created by Conversion  Replacement Utility updated for latest IMO load          Past Medical History:   Diagnosis Date    Anxiety     Cold sore      Past Surgical History:   Procedure Laterality Date    BIOPSY BREAST Right     benign     SECTION       Current Outpatient Medications   Medication Sig Dispense Refill    ALPRAZolam (XANAX) 0.25 MG tablet TAKE 1 TABLET BY MOUTH EVERY 8 HOURS AS NEEDED 30 tablet 0    calcium-vitamin D (CALCIUM-VITAMIN D) 500 mg(1,250mg) -200 unit per tablet [CALCIUM-VITAMIN D (CALCIUM-VITAMIN D) 500 MG(1,250MG) -200 UNIT PER TABLET] Take 1 tablet by mouth 2 (two) times a day with meals.      cholecalciferol, vitamin D3, (VITAMIN D3) 2,000 unit cap [CHOLECALCIFEROL, VITAMIN D3, (VITAMIN D3) 2,000 UNIT CAP] Take by mouth.      cyanocobalamin (VITAMIN B-12) 100 MCG tablet Take 100 mcg by mouth daily      multivitamin capsule [MULTIVITAMIN CAPSULE] Take 1 capsule by mouth daily.      ondansetron (ZOFRAN) 4 MG tablet Take 1 tablet (4 mg) by mouth every 8 hours as needed for nausea. 20 tablet 1    PARoxetine (PAXIL) 10 MG tablet Take 1 tablet (10 mg) by mouth every morning. 90 tablet 0    ezetimibe (ZETIA) 10 MG tablet Take 1 tablet (10 mg) by mouth daily. 90 tablet 3    tamsulosin (FLOMAX) 0.4 MG capsule Take 1 capsule (0.4 mg) by mouth daily. 30 capsule 0       Allergies   Allergen Reactions    Amoxicillin Unknown     Sinus infex on day 7/10 developed rash/hives    Penicillins Unknown     Sinus infex on day 7/10 developed rash/hives        Social History     Tobacco Use    Smoking status: Never    Smokeless tobacco: Never   Substance Use Topics    Alcohol use: Yes     Alcohol/week: 1.0 standard drink of alcohol     Family History   Problem Relation Age of Onset    Colon Cancer Mother 58    Hyperlipidemia Mother     Hyperlipidemia Father     Hypertension Father     Cancer Father         skin cancer, not melanoma    No Known Problems Sister     No Known Problems Sister      "Breast Cancer Maternal Grandmother 74    No Known Problems Maternal Grandfather     Alzheimer Disease Paternal Grandmother     No Known Problems Paternal Grandfather     No Known Problems Maternal Aunt     No Known Problems Paternal Aunt     Breast Cancer Other 54        Mat Cousin    Hereditary Breast and Ovarian Cancer Syndrome No family hx of     Endometrial Cancer No family hx of     Ovarian Cancer No family hx of      History   Drug Use No     Review of Systems  Review of systems negative otherwise known HPI.    Objective    /72 (BP Location: Left arm, Patient Position: Sitting, Cuff Size: Adult Regular)   Pulse 76   Temp 98.5  F (36.9  C) (Temporal)   Resp 16   Ht 1.647 m (5' 4.84\")   Wt 80.6 kg (177 lb 9.6 oz)   SpO2 98%   BMI 29.70 kg/m     Estimated body mass index is 29.7 kg/m  as calculated from the following:    Height as of this encounter: 1.647 m (5' 4.84\").    Weight as of this encounter: 80.6 kg (177 lb 9.6 oz).  Physical Exam  General: Alert, oriented, no acute distress.    Head: Normocephalic and atraumatic.   Eyes: Conjunctiva and sclera clear. SENA.   Ears: External ear nontender. TMs intact and clear. Grossly normal hearing.   Oropharynx: Dentition intact. Oral and posterior pharynx pink and moist.     Neck: Supple, no masses or nodes. No adenopathy.    Respiratory: Lungs clear, unlabored. No rales, rhonchi, or wheezes.    Cardiovascular: Regular rate and rhythm, S1 and S2. No murmurs. No peripheral edema.     Gastrointestinal: Normoactive bowel sounds. Soft, nontender. No CVA tenderness.   Musculoskeletal: No joint tenderness, deformity, or swelling.     Skin: No suspicious lesions, rashes, or abnormalities.    Neurologic: No gross motor or sensory deficit. Mentation intact and speech normal.     Psychiatric: Appropriate affect.     Recent Labs   Lab Test 10/14/24  0444 08/27/24  1405   HGB 14.3 14.2    241    138   POTASSIUM 3.9 4.2   CR 0.81 0.66   A1C  --  5.7*    "   Diagnostics  Labs completed 10/14/2024.   No EKG required, no history of coronary heart disease, significant arrhythmia, peripheral arterial disease or other structural heart disease.    Revised Cardiac Risk Index (RCRI)  The patient has the following serious cardiovascular risks for perioperative complications:   - No serious cardiac risks = 0 points     RCRI Interpretation: 0 points: Class I (very low risk - 0.4% complication rate)     Signed Electronically by: MCKENZIE Garcia CNP  A copy of this evaluation report is provided to the requesting physician.

## 2024-11-01 LAB
BACTERIA UR CULT: ABNORMAL

## 2024-11-04 ENCOUNTER — TELEPHONE (OUTPATIENT)
Dept: UROLOGY | Facility: CLINIC | Age: 64
End: 2024-11-04
Payer: COMMERCIAL

## 2024-11-04 DIAGNOSIS — N20.0 NEPHROLITHIASIS: Primary | ICD-10-CM

## 2024-11-04 NOTE — TELEPHONE ENCOUNTER
Procedure: bilateral URS     Date: 11/08  Provider: Alex  Time: 0700 arrive at 0600    Pre op appt: Done 10/29  UA/UC: Resulted, sent message to MD    Reviewed medications (anticoagulants, diabetes medications etc): reviewed per pre-op, no changes needed   Soap: reviewed  Reviewed when to start clear liquids and when to start NPO: reviewed  Confirmed pt has  and 24 hour observation: yes, pt's spouse    Pt or family member expressed understanding: yes    Danielle Lehman RN  11/4/2024  4:16 PM

## 2024-11-05 RX ORDER — CEPHALEXIN 500 MG/1
500 CAPSULE ORAL 2 TIMES DAILY
Qty: 6 CAPSULE | Refills: 0 | Status: SHIPPED | OUTPATIENT
Start: 2024-11-05

## 2024-11-05 NOTE — TELEPHONE ENCOUNTER
Spoke with patient regarding pre-op abx. Per Dr. Johnson, send 3 days of Keflex 500mg BID. Pt instructed to watch for rash.     She notes that the rash she developed previously from PCN was mild and over 30 years ago. Pt has no additional questions today.     Danielle RN  Care Coordinator- Urology   [phone]

## 2024-11-06 ENCOUNTER — ANESTHESIA EVENT (OUTPATIENT)
Dept: SURGERY | Facility: AMBULATORY SURGERY CENTER | Age: 64
End: 2024-11-06
Payer: COMMERCIAL

## 2024-11-08 ENCOUNTER — ANESTHESIA (OUTPATIENT)
Dept: SURGERY | Facility: AMBULATORY SURGERY CENTER | Age: 64
End: 2024-11-08
Payer: COMMERCIAL

## 2024-11-08 ENCOUNTER — HOSPITAL ENCOUNTER (OUTPATIENT)
Facility: AMBULATORY SURGERY CENTER | Age: 64
Discharge: HOME | End: 2024-11-08
Attending: UROLOGY
Payer: COMMERCIAL

## 2024-11-08 ENCOUNTER — TELEPHONE (OUTPATIENT)
Dept: UROLOGY | Facility: CLINIC | Age: 64
End: 2024-11-08

## 2024-11-08 VITALS
WEIGHT: 177 LBS | RESPIRATION RATE: 16 BRPM | DIASTOLIC BLOOD PRESSURE: 62 MMHG | BODY MASS INDEX: 29.6 KG/M2 | HEART RATE: 73 BPM | OXYGEN SATURATION: 97 % | SYSTOLIC BLOOD PRESSURE: 113 MMHG | TEMPERATURE: 97 F

## 2024-11-08 DIAGNOSIS — N20.1 RIGHT URETERAL CALCULUS: ICD-10-CM

## 2024-11-08 DIAGNOSIS — N20.0 NEPHROLITHIASIS: ICD-10-CM

## 2024-11-08 DEVICE — IMPLANTABLE DEVICE: Type: IMPLANTABLE DEVICE | Site: URETER | Status: FUNCTIONAL

## 2024-11-08 RX ORDER — CEFAZOLIN SODIUM 2 G/100ML
2 INJECTION, SOLUTION INTRAVENOUS
Status: COMPLETED | OUTPATIENT
Start: 2024-11-08 | End: 2024-11-08

## 2024-11-08 RX ORDER — PHENAZOPYRIDINE HYDROCHLORIDE 200 MG/1
200 TABLET, FILM COATED ORAL 3 TIMES DAILY PRN
Qty: 12 TABLET | Refills: 0 | Status: SHIPPED | OUTPATIENT
Start: 2024-11-08

## 2024-11-08 RX ORDER — OXYCODONE HCL 5 MG/5 ML
10 SOLUTION, ORAL ORAL
Status: DISCONTINUED | OUTPATIENT
Start: 2024-11-08 | End: 2024-11-09 | Stop reason: HOSPADM

## 2024-11-08 RX ORDER — OXYCODONE HCL 5 MG/5 ML
5 SOLUTION, ORAL ORAL
Status: DISCONTINUED | OUTPATIENT
Start: 2024-11-08 | End: 2024-11-09 | Stop reason: HOSPADM

## 2024-11-08 RX ORDER — DEXAMETHASONE SODIUM PHOSPHATE 4 MG/ML
INJECTION, SOLUTION INTRA-ARTICULAR; INTRALESIONAL; INTRAMUSCULAR; INTRAVENOUS; SOFT TISSUE PRN
Status: DISCONTINUED | OUTPATIENT
Start: 2024-11-08 | End: 2024-11-08

## 2024-11-08 RX ORDER — LIDOCAINE HYDROCHLORIDE 10 MG/ML
INJECTION, SOLUTION INFILTRATION; PERINEURAL PRN
Status: DISCONTINUED | OUTPATIENT
Start: 2024-11-08 | End: 2024-11-08

## 2024-11-08 RX ORDER — SODIUM CHLORIDE, SODIUM LACTATE, POTASSIUM CHLORIDE, CALCIUM CHLORIDE 600; 310; 30; 20 MG/100ML; MG/100ML; MG/100ML; MG/100ML
INJECTION, SOLUTION INTRAVENOUS CONTINUOUS
Status: DISCONTINUED | OUTPATIENT
Start: 2024-11-08 | End: 2024-11-09 | Stop reason: HOSPADM

## 2024-11-08 RX ORDER — ACETAMINOPHEN 650 MG/1
650 SUPPOSITORY RECTAL ONCE
Status: COMPLETED | OUTPATIENT
Start: 2024-11-08 | End: 2024-11-08

## 2024-11-08 RX ORDER — KETOROLAC TROMETHAMINE 30 MG/ML
INJECTION, SOLUTION INTRAMUSCULAR; INTRAVENOUS PRN
Status: DISCONTINUED | OUTPATIENT
Start: 2024-11-08 | End: 2024-11-08

## 2024-11-08 RX ORDER — ONDANSETRON 4 MG/1
4 TABLET, ORALLY DISINTEGRATING ORAL EVERY 30 MIN PRN
Status: DISCONTINUED | OUTPATIENT
Start: 2024-11-08 | End: 2024-11-09 | Stop reason: HOSPADM

## 2024-11-08 RX ORDER — TAMSULOSIN HYDROCHLORIDE 0.4 MG/1
0.4 CAPSULE ORAL DAILY
Qty: 30 CAPSULE | Refills: 0 | Status: SHIPPED | OUTPATIENT
Start: 2024-11-08

## 2024-11-08 RX ORDER — FENTANYL CITRATE 50 UG/ML
INJECTION, SOLUTION INTRAMUSCULAR; INTRAVENOUS PRN
Status: DISCONTINUED | OUTPATIENT
Start: 2024-11-08 | End: 2024-11-08

## 2024-11-08 RX ORDER — PROPOFOL 10 MG/ML
INJECTION, EMULSION INTRAVENOUS PRN
Status: DISCONTINUED | OUTPATIENT
Start: 2024-11-08 | End: 2024-11-08

## 2024-11-08 RX ORDER — PHENAZOPYRIDINE HYDROCHLORIDE 200 MG/1
200 TABLET, FILM COATED ORAL
Status: DISCONTINUED | OUTPATIENT
Start: 2024-11-08 | End: 2024-11-09 | Stop reason: HOSPADM

## 2024-11-08 RX ORDER — CEFAZOLIN SODIUM 2 G/100ML
2 INJECTION, SOLUTION INTRAVENOUS SEE ADMIN INSTRUCTIONS
Status: DISCONTINUED | OUTPATIENT
Start: 2024-11-08 | End: 2024-11-09 | Stop reason: HOSPADM

## 2024-11-08 RX ORDER — OXYBUTYNIN CHLORIDE 5 MG/1
5 TABLET, EXTENDED RELEASE ORAL DAILY
Qty: 14 TABLET | Refills: 0 | Status: SHIPPED | OUTPATIENT
Start: 2024-11-08

## 2024-11-08 RX ORDER — ONDANSETRON 2 MG/ML
INJECTION INTRAMUSCULAR; INTRAVENOUS PRN
Status: DISCONTINUED | OUTPATIENT
Start: 2024-11-08 | End: 2024-11-08

## 2024-11-08 RX ORDER — NALOXONE HYDROCHLORIDE 0.4 MG/ML
0.1 INJECTION, SOLUTION INTRAMUSCULAR; INTRAVENOUS; SUBCUTANEOUS
Status: DISCONTINUED | OUTPATIENT
Start: 2024-11-08 | End: 2024-11-09 | Stop reason: HOSPADM

## 2024-11-08 RX ORDER — FENTANYL CITRATE 0.05 MG/ML
50 INJECTION, SOLUTION INTRAMUSCULAR; INTRAVENOUS
Status: DISCONTINUED | OUTPATIENT
Start: 2024-11-08 | End: 2024-11-09 | Stop reason: HOSPADM

## 2024-11-08 RX ORDER — PROPOFOL 10 MG/ML
INJECTION, EMULSION INTRAVENOUS CONTINUOUS PRN
Status: DISCONTINUED | OUTPATIENT
Start: 2024-11-08 | End: 2024-11-08

## 2024-11-08 RX ORDER — ACETAMINOPHEN 325 MG/1
975 TABLET ORAL ONCE
Status: DISCONTINUED | OUTPATIENT
Start: 2024-11-08 | End: 2024-11-09 | Stop reason: HOSPADM

## 2024-11-08 RX ORDER — OXYBUTYNIN CHLORIDE 5 MG/1
5 TABLET, EXTENDED RELEASE ORAL AT BEDTIME
Status: DISCONTINUED | OUTPATIENT
Start: 2024-11-08 | End: 2024-11-09 | Stop reason: HOSPADM

## 2024-11-08 RX ORDER — ACETAMINOPHEN 325 MG/1
975 TABLET ORAL ONCE
Status: COMPLETED | OUTPATIENT
Start: 2024-11-08 | End: 2024-11-08

## 2024-11-08 RX ORDER — DEXAMETHASONE SODIUM PHOSPHATE 4 MG/ML
4 INJECTION, SOLUTION INTRA-ARTICULAR; INTRALESIONAL; INTRAMUSCULAR; INTRAVENOUS; SOFT TISSUE
Status: DISCONTINUED | OUTPATIENT
Start: 2024-11-08 | End: 2024-11-09 | Stop reason: HOSPADM

## 2024-11-08 RX ORDER — LIDOCAINE 40 MG/G
CREAM TOPICAL
Status: DISCONTINUED | OUTPATIENT
Start: 2024-11-08 | End: 2024-11-09 | Stop reason: HOSPADM

## 2024-11-08 RX ORDER — ONDANSETRON 2 MG/ML
4 INJECTION INTRAMUSCULAR; INTRAVENOUS EVERY 30 MIN PRN
Status: DISCONTINUED | OUTPATIENT
Start: 2024-11-08 | End: 2024-11-09 | Stop reason: HOSPADM

## 2024-11-08 RX ADMIN — PHENAZOPYRIDINE HYDROCHLORIDE 200 MG: 200 TABLET, FILM COATED ORAL at 08:52

## 2024-11-08 RX ADMIN — LIDOCAINE HYDROCHLORIDE 3 ML: 10 INJECTION, SOLUTION INFILTRATION; PERINEURAL at 07:15

## 2024-11-08 RX ADMIN — ACETAMINOPHEN 975 MG: 325 TABLET ORAL at 06:32

## 2024-11-08 RX ADMIN — FENTANYL CITRATE 50 MCG: 50 INJECTION, SOLUTION INTRAMUSCULAR; INTRAVENOUS at 07:34

## 2024-11-08 RX ADMIN — KETOROLAC TROMETHAMINE 15 MG: 30 INJECTION, SOLUTION INTRAMUSCULAR; INTRAVENOUS at 08:00

## 2024-11-08 RX ADMIN — ONDANSETRON 4 MG: 2 INJECTION INTRAMUSCULAR; INTRAVENOUS at 08:00

## 2024-11-08 RX ADMIN — DEXAMETHASONE SODIUM PHOSPHATE 10 MG: 4 INJECTION, SOLUTION INTRA-ARTICULAR; INTRALESIONAL; INTRAMUSCULAR; INTRAVENOUS; SOFT TISSUE at 07:21

## 2024-11-08 RX ADMIN — PROPOFOL 160 MCG/KG/MIN: 10 INJECTION, EMULSION INTRAVENOUS at 07:17

## 2024-11-08 RX ADMIN — FENTANYL CITRATE 50 MCG: 50 INJECTION, SOLUTION INTRAMUSCULAR; INTRAVENOUS at 07:15

## 2024-11-08 RX ADMIN — SODIUM CHLORIDE, SODIUM LACTATE, POTASSIUM CHLORIDE, CALCIUM CHLORIDE: 600; 310; 30; 20 INJECTION, SOLUTION INTRAVENOUS at 06:44

## 2024-11-08 RX ADMIN — PROPOFOL 200 MG: 10 INJECTION, EMULSION INTRAVENOUS at 07:15

## 2024-11-08 RX ADMIN — CEFAZOLIN SODIUM 2 G: 2 INJECTION, SOLUTION INTRAVENOUS at 07:10

## 2024-11-08 NOTE — ANESTHESIA POSTPROCEDURE EVALUATION
Patient: Monica Camacho    Procedure: Procedure(s):  Cystoscopy, BILATERAL Ureteroscopy, BILATERAL Holmium Laser Lithotripsy, Retrograde Pyelogram, BILATERAL Ureteral Stent Placement       Anesthesia Type:  General    Note:  Disposition: Outpatient   Postop Pain Control: Uneventful            Sign Out: Well controlled pain   PONV: No   Neuro/Psych: Uneventful            Sign Out: Acceptable/Baseline neuro status   Airway/Respiratory: Uneventful            Sign Out: Acceptable/Baseline resp. status   CV/Hemodynamics: Uneventful            Sign Out: Acceptable CV status; No obvious hypovolemia; No obvious fluid overload   Other NRE: NONE   DID A NON-ROUTINE EVENT OCCUR? No           Last vitals:  Vitals Value Taken Time   /56 11/08/24 0815   Temp 97  F (36.1  C) 11/08/24 0805   Pulse 83 11/08/24 0826   Resp 18 11/08/24 0815   SpO2 94 % 11/08/24 0826   Vitals shown include unfiled device data.    Electronically Signed By: Genevieve Branham MD  November 8, 2024  2:38 PM

## 2024-11-08 NOTE — TELEPHONE ENCOUNTER
Spoke with patient post-op. Questions about her medications and general stent management were answered.     Scheduled post-op follow up and [facility]IR[facility] visit. Patient would like to have stent removed at [place]. Will coordinate with staff at that location.     Pt has RN number for additional concerns.     VEENA Santos  Care Coordinator- Urology   [phone]

## 2024-11-08 NOTE — PROGRESS NOTES
I personally met with Monica Camacho and discussed their current medical situation.     We reviewed the nature of planned surgery, the risks benefits and complications and treatment alternatives.      Shared decision made to proceed with bilateral ureteroscopic

## 2024-11-08 NOTE — ANESTHESIA PREPROCEDURE EVALUATION
Anesthesia Pre-Procedure Evaluation    Patient: Monica Camacho   MRN: [id number] : 1960        Procedure : Procedure(s):  Cystoscopy, BILATERAL Ureteroscopy, BILATERAL Holmium Laser Lithotripsy, Right Retrograde Pyelogram, Possible BILATERAL Ureteral Stent Placement          Past Medical History:   Diagnosis Date    Anxiety     Cold sore       Past Surgical History:   Procedure Laterality Date    BIOPSY BREAST Right 1990    benign     SECTION  1985      Allergies   Allergen Reactions    Amoxicillin Unknown     Sinus infex on day 7/10 developed rash/hives    Penicillins Unknown     Sinus infex on day 7/10 developed rash/hives      Social History     Tobacco Use    Smoking status: Never    Smokeless tobacco: Never   Substance Use Topics    Alcohol use: Yes     Alcohol/week: 1.0 standard drink of alcohol     Types: 1 Standard drinks or equivalent per week     Comment: occasionally on weekends      Wt Readings from Last 1 Encounters:   24 80.3 kg (177 lb)        Anesthesia Evaluation   Pt has had prior anesthetic.     No history of anesthetic complications       ROS/MED HX  ENT/Pulmonary:       Neurologic:       Cardiovascular:       METS/Exercise Tolerance:     Hematologic:       Musculoskeletal:       GI/Hepatic:       Renal/Genitourinary:     (+) renal disease,             Endo:       Psychiatric/Substance Use:       Infectious Disease:       Malignancy:       Other:            Physical Exam    Airway        Mallampati: II    Neck ROM: full     Respiratory Devices and Support         Dental       (+) Minor Abnormalities - some fillings, tiny chips      Cardiovascular   cardiovascular exam normal          Pulmonary   pulmonary exam normal                OUTSIDE LABS:  CBC:   Lab Results   Component Value Date    WBC 7.3 10/14/2024    WBC 5.8 2024    HGB 14.3 10/14/2024    HGB 14.2 2024    HCT 42.9 10/14/2024    HCT 43.3 2024     10/14/2024     2024     BMP:  "  Lab Results   Component Value Date     10/14/2024     08/27/2024    POTASSIUM 3.9 10/14/2024    POTASSIUM 4.2 08/27/2024    CHLORIDE 104 10/14/2024    CHLORIDE 101 08/27/2024    CO2 20 (L) 10/14/2024    CO2 24 08/27/2024    BUN 22.6 10/14/2024    BUN 16.9 08/27/2024    CR 0.81 10/14/2024    CR 0.66 08/27/2024     (H) 10/14/2024    GLC 92 08/27/2024     COAGS: No results found for: \"PTT\", \"INR\", \"FIBR\"  POC: No results found for: \"BGM\", \"HCG\", \"HCGS\"  HEPATIC:   Lab Results   Component Value Date    ALBUMIN 3.9 03/21/2022    PROTTOTAL 7.2 03/21/2022    ALT 24 03/21/2022    AST 19 03/21/2022    ALKPHOS 71 03/21/2022    BILITOTAL 0.3 03/21/2022     OTHER:   Lab Results   Component Value Date    A1C 5.7 (H) 08/27/2024    JACOB 10.5 (H) 10/14/2024    TSH 1.63 03/21/2022       Anesthesia Plan    ASA Status:  2       Anesthesia Type: General.     - Airway: LMA              Consents    Anesthesia Plan(s) and associated risks, benefits, and realistic alternatives discussed. Questions answered and patient/representative(s) expressed understanding.     - Discussed: Risks, Benefits and Alternatives for the PROCEDURE were discussed     - Discussed with:  Patient      - Extended Intubation/Ventilatory Support Discussed: No.      - Patient is DNR/DNI Status: No     Use of blood products discussed: No .     Postoperative Care            Comments:               Genevieve Branham MD    I have reviewed the pertinent notes and labs in the chart from the past 30 days and (re)examined the patient.  Any updates or changes from those notes are reflected in this note.                         # Overweight: Estimated body mass index is 29.6 kg/m  as calculated from the following:    Height as of 10/29/24: 1.647 m (5' 4.84\").    Weight as of this encounter: 80.3 kg (177 lb).             "

## 2024-11-08 NOTE — DISCHARGE INSTRUCTIONS
If you have any questions or concerns regarding your procedure please contact Dr. Johnson, his office number is [phone].    You have received 975 mg of Acetaminophen (Tylenol) at 6:30am. Please do not take an additional dose of Tylenol until after 12:30pm.     Do not exceed 4,000 mg of acetaminophen during a 24 hour period and keep in mind that acetaminophen can also be found in many over-the-counter cold medications as well as narcotics that may be given for pain.     You received a dose of IV Toradol at 8:00am. Please do not take any additional Ibuprofen/NSAID products (Aleve/Advil/Motrin/Naproxen/Celebrex) until after 2:00pm.    Going Home With a Ureteral Stent    What is it?  A stent is a soft, plastic tube that helps urine (pee) drain into the bladder.  During the surgery, it is placed in the ureter the tube that connects the kidney to the bladder.  A thin curl at each end of the stent keeps one end in your kidney and the other in your bladder.  The stent can not be seen from outside of the body.    Why Do I Have It?  Some sort of blockage is not letting pee drain into your bladder.  This could be from a stone, certain surgeries or kidney infection.    What Should I Expect?   Stents often cause some discomfort.  You may have:    The need to pee suddenly    Pain when you pee    A dull backache, which may get worse when you pee  Blood in your pee (color of fruit punch) and some clots, which may increase with physical activity.     What Can I Do To Feel Better?    Drink a little more fluids than usual.  You can eat your normal diet.    Enjoy a warm bath.  Decrease your activity.  Some people find bending or twisting movement cause discomfort or increased blood in the urine.  Even so, you will not harm yourself.                                                           Kidney Stone Lithia                                                       [facility]   Adult Discharge Orders & Instructions      For 24 hours after surgery    Get plenty of rest.  A responsible adult must stay with you for at least 24 hours after you leave the hospital.   Do not drive or use heavy equipment.  If you have weakness or tingling, don't drive or use heavy equipment until this feeling goes away.  Do not drink alcohol.  Avoid strenuous or risky activities.  Ask for help when climbing stairs.   You may feel lightheaded.  IF so, sit for a few minutes before standing.  Have someone help you get up.   If you have nausea (feel sick to your stomach): Drink only clear liquids such as apple juice, ginger ale, broth or 7-Up.  Rest may also help.  Be sure to drink enough fluids.  Move to a regular diet as you feel able.  You may have a slight fever. Call the doctor if your fever is over 100 F (37.7 C) (taken under the tongue) or lasts longer than 24 hours.  You may have a dry mouth, a sore throat, muscle aches or trouble sleeping.  These should go away after 24 hours.  Do not make important or legal decisions.   Call your doctor for any of the followin.  Signs of infection (fever, growing tenderness at the surgery site, a large amount of drainage or bleeding, severe pain, foul-smelling drainage, redness, swelling).    2. It has been over 8 to 10 hours since surgery and you are still not able to urinate (pass water).    3.  Headache for over 24 hours.

## 2024-11-08 NOTE — OP NOTE
PREOPERATIVE DIAGNOSIS:  Bilateral renal stones  POSTOPERATIVE DIAGNOSIS: Same  PROCEDURES PERFORMED:    Cystoscopy  Right ureteroscopy with thulium laser lithotripsy  Right retrograde pyelogram with interpretation of imaging  Right ureteral stent placement  Left retrograde pyelogram with interpretation of imaging  Left ureteroscopy with thulium laser lithotripsy and stone basket extraction  Left ureteral stent placement    STAFF SURGEON: Jose Enrique Johnson MD  RESIDENT(S) none present    ANESTHESIA: General  ESTIMATED BLOOD LOSS: 1 ml  COMPLICATIONS: None  SPECIMEN: Left renal stone for analysis  URETERAL STENT(S): Bilateral 6 Burkinan by 24 cm silicone stents    SIGNIFICANT FINDINGS: 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  After a discussion of all risks, benefits, and alternatives, the patient elected to proceed with definitive stone management. The patient understands the potential need for more than one procedure to eliminate all stone burden.      DESCRIPTION OF PROCEDURE:  After informed consent was obtained, the patient was transported to the operating room & placed supine on the table. After adequate anesthesia was induced, the patient was placed in lithotomy and prepped and draped in the usual sterile fashion. A timeout was taken to confirm correct patient, procedure and laterality. Pre-operative IV antibiotics were administered.     We started the procedure by performing cystoscopy.  The bladder was inspected and was anatomically unremarkable.  The right ureteral orifice was identified and engaged with a sensor wire which passed easily to the kidney.  We gently dilated the distal ureter to 10 Burkinan.  We passed the flexible ureteroscope adjacent to the wire and up into the kidney.  Initial inspection was unremarkable for stones but we could see a  radiopaque calculus on x-ray.  This was identified as being within the renal papilla and a lower pole calyx.  It appeared to be suspicious for medullary sponge kidney.  We used a 200  m thulium laser at a setting of 1 J and 10 Hz to gently unroofed the stone.  Once we released the embedded stone we proceeded to dust it into fine sediment with the laser.  Final inspection of the kidney was unremarkable.  Final retrograde pyelogram revealed no filling defects and there was no evidence of any further radiopaque calcifications within the area of the kidney.  Pullback ureteroscopy was unremarkable and we left a 6 Anguillan by 24 cm double-J stent with a string on it to facilitate removal in 1 week.  We turned our attention to the left side identifying the ureter and cannulating it with a wire.  We passed the flexible ureteroscope over the wire and up into the left kidney.  There was some medullary sponge type changes in the upper pole as well as a papillary overgrowth stone in the midpole calyx.  We basket extracted one of the overlying stones and sent it for analysis.  The remainder of the stone which was embedded in the tissue was released with a 200  m thulium laser at a setting of 1 J and 10 Hz.  We subsequently dusted all the fragments until there were no pieces greater than a millimeter or 2.  Final retrograde showed no extravasation and there was no evidence of any other calcification within the kidney.  Pullback ureteroscopy was unremarkable.  We deployed a 6 Anguillan by 24 cm double-J stent with a full curl in the kidney and the bladder.  We left a string on the side as well.    The bladder was drained and the patient was awoken from anesthesia and transported to the postanesthesia care unit in stable condition.     POSTOP PLAN:  Stent removal via string in the office next week.  Follow-up 2 months with imaging.

## 2024-11-08 NOTE — ANESTHESIA CARE TRANSFER NOTE
Patient: Monica Camacho    Procedure: Procedure(s):  Cystoscopy, BILATERAL Ureteroscopy, BILATERAL Holmium Laser Lithotripsy, Retrograde Pyelogram, BILATERAL Ureteral Stent Placement       Diagnosis: Right ureteral calculus [N20.1]  Nephrolithiasis [N20.0]  Diagnosis Additional Information: No value filed.    Anesthesia Type:   General     Note:    Oropharynx: oropharynx clear of all foreign objects and spontaneously breathing  Level of Consciousness: drowsy  Oxygen Supplementation: face mask  Level of Supplemental Oxygen (L/min / FiO2): 6  Independent Airway: airway patency satisfactory and stable  Dentition: dentition unchanged  Vital Signs Stable: post-procedure vital signs reviewed and stable  Report to RN Given: handoff report given  Patient transferred to: PACU    Handoff Report: Identifed the Patient, Identified the Reponsible Provider, Reviewed the pertinent medical history, Discussed the surgical course, Reviewed Intra-OP anesthesia mangement and issues during anesthesia, Set expectations for post-procedure period and Allowed opportunity for questions and acknowledgement of understanding      Vitals:  Vitals Value Taken Time   /64 11/08/24 0805   Temp 36.1  C (97  F) 11/08/24 0805   Pulse 82 11/08/24 0805   Resp 18 11/08/24 0805   SpO2 99 % 11/08/24 0805       Electronically Signed By: MCKENZIE Rivero CRNA  November 8, 2024  8:08 AM

## 2024-11-13 LAB
APPEARANCE STONE: NORMAL
COMPN STONE: NORMAL
SPECIMEN WT: 7 MG

## 2024-11-15 ENCOUNTER — ALLIED HEALTH/NURSE VISIT (OUTPATIENT)
Dept: UROLOGY | Facility: CLINIC | Age: 64
End: 2024-11-15
Payer: COMMERCIAL

## 2024-11-15 DIAGNOSIS — N20.0 NEPHROLITHIASIS: Primary | ICD-10-CM

## 2024-11-15 NOTE — PROGRESS NOTES
Monica Camacho comes into clinic today at the request of Dr. Johnson for bilateral stent on string removal due to kidney stones.  Patient has tolerated the stents well with general symptoms.  Stent removal education reviewed with patient and handout given.      With patient laying on exam bed, the strings were located taped onto the left inner thigh.  The stents were pulled out intact without difficulty.  Patient tolerated well.       This service provided today was under the supervising provider of the day Dr. Johnson, who was available if needed.    Noris Simental, CMA

## 2024-12-18 ENCOUNTER — HOSPITAL ENCOUNTER (OUTPATIENT)
Dept: ULTRASOUND IMAGING | Facility: HOSPITAL | Age: 64
Discharge: HOME | End: 2024-12-18
Attending: UROLOGY
Payer: COMMERCIAL

## 2024-12-18 ENCOUNTER — HOSPITAL ENCOUNTER (OUTPATIENT)
Dept: GENERAL RADIOLOGY | Facility: HOSPITAL | Age: 64
Discharge: HOME | End: 2024-12-18
Attending: UROLOGY
Payer: COMMERCIAL

## 2024-12-18 DIAGNOSIS — N20.0 NEPHROLITHIASIS: ICD-10-CM

## 2024-12-18 PROCEDURE — 74019 RADEX ABDOMEN 2 VIEWS: CPT

## 2024-12-18 PROCEDURE — 76770 US EXAM ABDO BACK WALL COMP: CPT

## 2024-12-19 ENCOUNTER — VIRTUAL VISIT (OUTPATIENT)
Dept: UROLOGY | Facility: CLINIC | Age: 64
End: 2024-12-19
Payer: COMMERCIAL

## 2024-12-19 DIAGNOSIS — N20.0 CALCIUM OXALATE KIDNEY STONES: ICD-10-CM

## 2024-12-19 DIAGNOSIS — N20.0 NEPHROLITHIASIS: Primary | ICD-10-CM

## 2024-12-19 NOTE — PROGRESS NOTES
Urology Video Office Visit    Video-Visit Details    Type of service:  Video Visit    Video Start Time: 1430    Video End Time: 1500    Originating Location (pt. Location): Home    Distant Location (provider location):  Off-site     Platform used for Video Visit: Ismole           Assessment and Plan:     Assessment: 64 year old female with history of CaOx stones    Plan:  -Reviewed imaging with patient. No noted hydronephrosis. Noted left calcifications likely related to medullary sponge kidney/embedded stones.   -The patient and I discussed the diagnosis and natural history of urolithiasis. Discussed option of 24 hour urine study for further evaluation. She would like to hold off at this time.   -We discussed some general measures to prevent recurrent kidney stones.  These include fluid intake to achieve 2.5 liters of urine per day, decreased salt intake, normal calcium intake, lowering animal protein intake, avoiding high amounts of oxalate containing foods, and increased citrate intake with orange juice/lemonade. Recommend to stop additional vitamin supplements of C & D.   -RTC in 1 year with CT scan or sooner DIOGO Astudillo CNP  Department of Urology  December 19, 2024    I spent a total of 30 minutes spent on the date of the encounter doing chart review, history and exam, documentation, and further activities as noted above.          Chief Complaint:   Post-op follow up and Stone         History of Present Illness:    Monica Camacho is a pleasant 64 year old female who presents with concerns of a post-op follow up and stone prevention.     Ms. Camacho had a bilateral URS/LL on 11/08/24 with Dr. Johnson. Suspect medullary sponge kidney with papillary plugging and embedded stones. Stent was removed in clinic on 11/15/24.     Stone Analysis: 80% calcium oxalate monohydrate,   10% calcium oxalate dihydrate, and   10% calcium phosphate (hydroxy- and carbonate- apatite).     Renal US on 12/18/24 (images  personally reviewed) revealed bilateral symmetrical kidneys without hydronephrosis. Unremarkable bladder. KUB on 24 (images personally reviewed) revealed noted left renal calcification.     She is doing well today. Denies any flank pain, gross hematuria, dysuria, or history of UTI's since her procedure.      CT scan on 10/14/24 (images personally reviewed) revealed a right distal 6mm x 4mm distal ureteral stone. Noted two adjacent right lower pole stones measuring 4mm in size and a 3mm upper pole stone. Noted two left nonobstructing renal stone measuring 4mm and 6mm in size. No noted left hydronephrosis.      Her first stone was in  with her 1st pregnancy;  was her second stone episode during her 2nd pregnancy. She had another stone episode which did require a definitive stone procedure with post-op ureteral stent placement.     Family history of stones in sister     Stone Risk Factors: None    Fluid intake is about 120oz of water per day. Likes to drink water. She will squeeze the juice of one lemon into her water daily. Doesn't consume dairy.     She was previously on a multivitamin, vitamin C, D, and calcium with vitamin D.             Past Medical History:     Past Medical History:   Diagnosis Date    Anxiety     Cold sore             Past Surgical History:     Past Surgical History:   Procedure Laterality Date    BIOPSY BREAST Right     benign     SECTION      COMBINED CYSTOSCOPY, RETROGRADES, URETEROSCOPY, LASER HOLMIUM LITHOTRIPSY URETER(S), INSERT STENT Bilateral 2024    Procedure: Cystoscopy, BILATERAL Ureteroscopy, BILATERAL Holmium Laser Lithotripsy, Retrograde Pyelogram, BILATERAL Ureteral Stent Placement;  Surgeon: Jose Enrique Johnson MD;  [facility]: [facility]            Medications     Current Outpatient Medications   Medication Sig Dispense Refill    ALPRAZolam (XANAX) 0.25 MG tablet TAKE 1 TABLET BY MOUTH EVERY 8 HOURS AS NEEDED 30 tablet 0     calcium-vitamin D (CALCIUM-VITAMIN D) 500 mg(1,250mg) -200 unit per tablet [CALCIUM-VITAMIN D (CALCIUM-VITAMIN D) 500 MG(1,250MG) -200 UNIT PER TABLET] Take 1 tablet by mouth 2 (two) times a day with meals.      cephALEXin (KEFLEX) 500 MG capsule Take 1 capsule (500 mg) by mouth 2 times daily. 6 capsule 0    cholecalciferol, vitamin D3, (VITAMIN D3) 2,000 unit cap [CHOLECALCIFEROL, VITAMIN D3, (VITAMIN D3) 2,000 UNIT CAP] Take by mouth.      cyanocobalamin (VITAMIN B-12) 100 MCG tablet Take 100 mcg by mouth daily      ezetimibe (ZETIA) 10 MG tablet Take 1 tablet (10 mg) by mouth daily. 90 tablet 3    multivitamin capsule [MULTIVITAMIN CAPSULE] Take 1 capsule by mouth daily.      oxyBUTYnin ER (DITROPAN XL) 5 MG 24 hr tablet Take 1 tablet (5 mg) by mouth daily. 14 tablet 0    PARoxetine (PAXIL) 10 MG tablet Take 1 tablet (10 mg) by mouth every morning. 90 tablet 0    phenazopyridine (PYRIDIUM) 200 MG tablet Take 1 tablet (200 mg) by mouth 3 times daily as needed. 12 tablet 0    tamsulosin (FLOMAX) 0.4 MG capsule Take 1 capsule (0.4 mg) by mouth daily. 30 capsule 0     No current facility-administered medications for this visit.            Family History:     Family History   Problem Relation Age of Onset    Colon Cancer Mother 58    Hyperlipidemia Mother     Hyperlipidemia Father     Hypertension Father     Cancer Father         skin cancer, not melanoma    Breast Cancer Maternal Grandmother 74    No Known Problems Maternal Grandfather     Alzheimer Disease Paternal Grandmother     No Known Problems Paternal Grandfather     No Known Problems Sister     No Known Problems Sister     No Known Problems Maternal Aunt     No Known Problems Paternal Aunt     Breast Cancer Other 54        Mat Cousin    Hereditary Breast and Ovarian Cancer Syndrome No family hx of     Endometrial Cancer No family hx of     Ovarian Cancer No family hx of     Anesthesia Reaction No family hx of     Malignant Hyperthermia No family hx of              Social History:     Social History     Socioeconomic History    Marital status:      Spouse name: Not on file    Number of children: Not on file    Years of education: Not on file    Highest education level: Not on file   Occupational History    Not on file   Tobacco Use    Smoking status: Never    Smokeless tobacco: Never   Vaping Use    Vaping status: Never Used   Substance and Sexual Activity    Alcohol use: Yes     Alcohol/week: 1.0 standard drink of alcohol     Types: 1 Standard drinks or equivalent per week     Comment: occasionally on weekends    Drug use: No    Sexual activity: Yes     Partners: Male     Birth control/protection: Post-menopausal   Other Topics Concern    Not on file   Social History Narrative    Not on file     Social Drivers of Health     Financial Resource Strain: Low Risk  (8/22/2024)    Financial Resource Strain     Within the past 12 months, have you or your family members you live with been unable to get utilities (heat, electricity) when it was really needed?: No   Food Insecurity: Low Risk  (8/22/2024)    Food Insecurity     Within the past 12 months, did you worry that your food would run out before you got money to buy more?: No     Within the past 12 months, did the food you bought just not last and you didn t have money to get more?: No   Transportation Needs: Low Risk  (8/22/2024)    Transportation Needs     Within the past 12 months, has lack of transportation kept you from medical appointments, getting your medicines, non-medical meetings or appointments, work, or from getting things that you need?: No   Physical Activity: Sufficiently Active (8/22/2024)    Exercise Vital Sign     Days of Exercise per Week: 5 days     Minutes of Exercise per Session: 30 min   Stress: No Stress Concern Present (8/22/2024)    [employer] - Occupational Stress Questionnaire     Feeling of Stress : Not at all   Social Connections: Unknown (8/22/2024)     Social Connection and Isolation Panel [NHANES]     Frequency of Communication with Friends and Family: Not on file     Frequency of Social Gatherings with Friends and Family: Once a week     Attends Druze Services: Not on file     Active Member of Clubs or Organizations: Not on file     Attends Club or Organization Meetings: Not on file     Marital Status: Not on file   Interpersonal Safety: Low Risk  (10/29/2024)    Interpersonal Safety     Do you feel physically and emotionally safe where you currently live?: Yes     Within the past 12 months, have you been hit, slapped, kicked or otherwise physically hurt by someone?: No     Within the past 12 months, have you been humiliated or emotionally abused in other ways by your partner or ex-partner?: No   Housing Stability: Low Risk  (8/22/2024)    Housing Stability     Do you have housing? : Yes     Are you worried about losing your housing?: No            Allergies:   Amoxicillin and Penicillins         Review of Systems:  From intake questionnaire   Negative 14 system review except as noted on HPI, nurse's note.         Physical Exam:   General Appearance: Well groomed, hygenic  Eyes: No redness, discharge  Respiratory: No cough, no respiratory distress or labored breathing  Musculoskeletal: Grossly normal, full range of motion in upper extremities, no gross deficits  Skin: No discoloration or apparent rashes  Neurologic - No tremors  Psychiatric - Alert and oriented  The rest of a comprehensive physical examination is deferred due to video visit restrictions        Labs:    I personally reviewed all applicable laboratory data and went over findings with patient  Significant for:    CBC RESULTS:  Recent Labs   Lab Test 10/14/24  0444 08/27/24  1405 06/02/23  1402   WBC 7.3 5.8 5.8   HGB 14.3 14.2 14.3    241 242        BMP RESULTS:  Recent Labs   Lab Test 10/14/24  0444 08/27/24  1405 06/02/23  1402 03/21/22  1057 03/15/21  1215 02/17/20  1044  02/11/19  1036 02/05/18  0819    138 141 139 142 140 142 141   POTASSIUM 3.9 4.2 3.6 4.1 4.5 4.2 4.3 4.4   CHLORIDE 104 101 103 104 106 105 107 107   CO2 20* 24 25 22 26 25 26 25   ANIONGAP 19* 13 13 13 10 10 9 9   * 92 121* 98 92 97 98 100   BUN 22.6 16.9 16.4 18 16 14 18 19   CR 0.81 0.66 0.71 0.72 0.73 0.75 0.72 0.66   GFRESTIMATED 82 >90 >90 >90 >60 >60 >60 >60   GFRESTBLACK  --   --   --   --  >60 >60 >60 >60   JACOB 10.5* 9.9 9.8 9.6 9.4 10.1 10.0 9.1       UA RESULTS:   Recent Labs   Lab Test 10/14/24  0522   SG 1.021   URINEPH 6.0   NITRITE Negative   RBCU 4*   WBCU 10*       CALCIUM RESULTS  Lab Results   Component Value Date    JACOB 10.5 10/14/2024    JACOB 9.9 08/27/2024    JACOB 9.8 06/02/2023           Imaging:    I personally reviewed all applicable imaging and went over the below findings with patient.    Results for orders placed or performed during the hospital encounter of 12/18/24   XR Abdomen 2 Views    Narrative    EXAM: XR ABDOMEN 2 VIEWS  LOCATION: [facility]  DATE: 12/18/2024    INDICATION:  Nephrolithiasis  COMPARISON: 10/14/2024 CT      Impression    IMPRESSION: Lung bases are clear. Bowel gas pattern is nonobstructive. There are several left-sided pelvic phleboliths. Distal right ureteric calculus seen at CT is not clearly appreciated at plain film. Thoracolumbar scoliosis noted, with moderate   multilevel spondylosis.

## 2024-12-19 NOTE — NURSING NOTE
Current patient location:  [place]    Is the patient currently in the state of [place]? YES    Visit mode:VIDEO    If the visit is dropped, the patient can be reconnected by:VIDEO VISIT: Text to cell phone:   Telephone Information:   Mobile [phone]       Will anyone else be joining the visit? NO  (If patient encounters technical issues they should call [phone])    Are changes needed to the allergy or medication list? No    Are refills needed on medications prescribed by this physician? NO    Rooming Documentation:  Questionnaire(s) completed    Reason for visit: Video Visit and RECHECK    Ana LOWERY

## 2024-12-19 NOTE — PATIENT INSTRUCTIONS
UROLOGY CLINIC VISIT PATIENT INSTRUCTIONS    -Please contact [facility] Billing at [phone] or visit [email or web address] for more information.    Patient Relations Number: [phone]    If you have any issues, questions or concerns in the meantime, do not hesitate to contact us at [facility] at [phone] or via [facility].     Stefani Astudillo CNP  Department of Urology     DIET & LIFESTYLE CHANGES FOR PATIENTS WITH KIDNEY STONES    If you've had a kidney stone, you are likely to form another. Risk of recurrence is 15% at 1 year, 35% to 40% at 2 years, and 50% at 10 years. Therefore, prevention is very important. . These recommendations have shown to be effective.    CALCIUM STONES (Oxalate and Phosphate)    Fluid intake is the most important prevention measure to help prevent stones. Fluid intake should be at least 2.5 liters per day or 90-120oz per day. With goal of urine output of >2.5L per day.   Increasing liquids that have citric acid may help such as low calorie orange juice, lemonade (Crystal Light Lemonade or True Lemon/Lime), or adding a citrus to your water.  You can add lemon juice or fresh cliff to your water daily.  Lemon juice increases the citrate in your urine, and helps decrease the formation of stone and even breakdown certain types of stones. Add a cap full/teaspoon of pure lemon juice to each glass.   Try to limit sugar, especially if you have diabetes.    Helpful Fluid Measurements:  1 liter = 34oz  1 quart = 32 oz  24 pack water: Each bottle 16.9 oz     Low Oxalate Diet: Limit your consumption of OXALATE-rich foods including:  All nuts and nut products including peanuts, almonds,peanut butter, almond milk  Spinach  Rhubarb  Beets  Chocolate  Soybeans and soy products     Website:   www.[email or web address].com    Below is a link to a PDF that is based on [employer] research. Please stick to pages 6-9 of this  "document. My suggestion is to review the list of food that is OK. The \"avoid\" list can be overwhelming.  [email or web address]    Low Sodium Diet: Salt (sodium chloride) is found in abundance in many foods. High sodium levels in the urine can interfere with the kidney's handling of calcium.   Trying a DASH (Dietary Approaches to Stop Hypertension) diet which is eating more fruits and vegetables, limiting salt intake, moderate in low-fat diary products, and low in animal protein.   Try to decrease salt intake to <2000 mg of sodium daily.     Tips for reducing the salt in your diet:  Don't use salt at the table  Reduce the salt used in food preparation. Try 1/2 teaspoon when recipes call for 1 teaspoon.  Use herbs and spices for flavoring instead of salt.  Avoid salty foods.  Check the label before you buy or use a product. Note sodium and portion size information.  Try to consume less than 2,000 mg/day. (1 teaspoon = 2,000 mg)    Foods with high sodium content include:  Processed meat (including luncheon meats, sausage)   Crackers   Instant cereal   Processed cheese   Canned soups   Chips and snack foods   Soy sauce    Low Animal Protein: Reduce animal protein (meat) intake to no more than 8-10 ounces per day. Increase fruit and vegetables to 5 servings per day.    Maintain a normal calcium diet: Calcium rich foods are encouraged, but no more than 1000 - 1200 mg per day. Researches have found that people with low calcium intakes tend to have more stones. Foods with high calcium content are acceptable and include:  Calcium rich foods include:   Diary (cheese, milk, and yogurt)  Enriched cereals  Meat and fish  Dark green vegetables    Limit Vitamin C intake to < 1000 mg daily.      "

## 2025-01-20 DIAGNOSIS — F41.1 ANXIETY STATE: ICD-10-CM

## 2025-01-20 RX ORDER — PAROXETINE 10 MG/1
10 TABLET, FILM COATED ORAL EVERY MORNING
Qty: 90 TABLET | Refills: 3 | Status: SHIPPED | OUTPATIENT
Start: 2025-01-20

## 2025-07-30 NOTE — LETTER
Letter by Tianna Zimmerman MD at      Author: Tianna Zimmerman MD Service: -- Author Type: --    Filed:  Encounter Date: 3/15/2021 Status: (Other)         [facility]  03/15/21  Patient: Monica Camacho  YOB: 1960  Medical Record Number: [id number]  CSN: [id number]                                                                              Non-Opioid Controlled Substance Agreement    This is an agreement between you and your provider regarding safe and appropriate controlled substance prescribing.? Controlled substances are?medicines that can cause physical and mental dependence. The manufacturing, possession and use of these medicines are regulated by law.  We here at [facility] are making a commitment to work with you in your efforts to get better.? To support you in this work, we will help you schedule regular appointments for medicine refills. If we must cancel or change your appointment for any reason, we will make sure you have enough medication to last until your next appointment.      As a Provider, I will:     Listen carefully to your concerns while treating you with dignity.     Recommend a treatment plan that I believe is in your best interest and may involve therapies other than medication.      Review the chance of benefit and the chance of harm of this medicine with you regularly and evaluate the safety and effectiveness of this therapy.       Provide a plan on how to discontinue if the decision is made to stop this medicine.       As a Patient, I understand controlled substances:       Are prescribed by my care provider to help me function or work and manage my condition(s).?    Are strong medicines and can cause serious side effects such as:     Drowsiness, which can seriously affect my driving ability    A lower breathing rate, enough to cause death    Harm to my thinking ability     Depression     Abuse of and addiction to this medicine.      Need to  Received call from daughter, Chapis, requesting letter of medical necessity. Reports patient receives funds from spouse pension and annually needs to sign document to continue receiving funds. Chapis states she is unable to take patient to location to sign documentation and needs letter stating when patient was last seen by provider and reason (Dx) justifying homebound status and unable to sign documents.     Chapis is not active with [employer] and requests to have letter mailed to patient home. Also requests call back when letter is mailed. Reports final documentation with letter is due 8/15/25.     TE routed to [facility] care team for follow up.    be taken exactly as prescribed.?Combining controlled substances with certain medicines or chemicals (such as illegal drugs, opioids, sedatives, sleeping pills, and benzodiazepines) can be dangerous or even fatal.? If I stop taking my medicines suddenly, I may have severe withdrawal symptoms.     The risks, benefits, and side effects of these medicine(s) were explained to me. I agree that:    1. I will take part in other treatments as advised by my care team. This may be psychiatry or counseling, physical therapy, behavioral therapy, group treatment or a referral to specialist.    2. I will keep all my appointments and understand this is part of the monitoring of controlled substance.?My care team may require an office visit for EVERY controlled substance refill. If I miss appointments or dont follow instructions, my care team may stop my medicine    3. I will take my medicines as prescribed. I will not change the dose or schedule unless my care team tells me to. There will be no refills if I run out early.      4. I may be contactedwithout warning and asked to complete a urine drug test or pill count at any time. If I dont give a urine sample or participate in a pill count, the care team may stop my medicine.    5. I will only receive controlled substance prescriptions from this clinic. If treated by another provider, I will let them know I am taking controlled substances and that I have a treatment agreement with this provider. I will inform this care team within one business day if I am given a prescription for any controlled substance by another healthcare provider. This care team may contact other providers and pharmacists about my use of the medicines.     6. It is up to me to make sure that I do not run out of my medicines on weekends or holidays.?If my care team is willing to refill my prescription without a visit, I must request refills only during office hours. Refills may take up to 3 business days to  process.  I will use one pharmacy to fill all my controlled substance prescriptions.  I will notify the clinic if any changes are made due to insurance changes or medication availability.    7. I am responsible for my prescriptions. If the medicine/prescription is lost, stolen or destroyed, it will not be replaced.?I also agree not to share controlled substance medicines with anyone.     8. I am aware I should not use any illegal or recreational drugs. I agree not to drink alcohol unless my care team says I can.     9. If I enroll in the [place] [facility] Cannabis program, I will tell my care team.?    10. I will tell my care team right away if I become pregnant, have a new medical problem treated outside of my regular clinic, or have a change in my medications.     11. I understand that this medicine can affect my thinking, judgment and reaction time.? Alcohol and drugs affect the brain and body, which can affect the safety of a persons driving. Being under the influence of alcohol or drugs can affect a persons decision-making, behaviors, personal safety, and the safety of others. Driving while impaired (DWI) can occur if a person is driving, operating, or in physical control of a car, motorcycle, boat, snowmobile, ATV, motorbike, off-road vehicle, or any other motor vehicle ([place] Statute 169[place].20). I understand the risk if I choose to drive or operate machinery  I understand that if I do not follow any of the conditions above, my prescriptions or treatment may be stopped or changed.     I agree that my provider, clinic care team, and pharmacy may work with any city, state or federal law enforcement agency that investigates the misuse, sale, or other diversion of my controlled medicine. I will allow my provider to discuss my care with or share a copy of this agreement with any other treating provider, pharmacy or emergency room where I receive care.?    I have read this agreement and have asked questions about  anything I did not understand.    ________________________________________________________  Patient Signature - Monica J Wydra     ___________________                   Date     ________________________________________________________  Provider Signature - Tianna E Zimmerman, MD       ___________________                   Date     ________________________________________________________  Witness Signature (required if provider not present while patient signing)          ___________________                   Date

## 2025-08-25 DIAGNOSIS — F41.1 ANXIETY STATE: ICD-10-CM

## 2025-08-25 RX ORDER — ALPRAZOLAM 0.25 MG
0.25 TABLET ORAL
Qty: 28 TABLET | Refills: 0 | Status: SHIPPED | OUTPATIENT
Start: 2025-08-25

## 2025-09-02 ENCOUNTER — OFFICE VISIT (OUTPATIENT)
Dept: FAMILY MEDICINE | Facility: CLINIC | Age: 65
End: 2025-09-02
Payer: COMMERCIAL

## 2025-09-02 VITALS
WEIGHT: 183 LBS | DIASTOLIC BLOOD PRESSURE: 82 MMHG | OXYGEN SATURATION: 100 % | SYSTOLIC BLOOD PRESSURE: 136 MMHG | HEIGHT: 64 IN | RESPIRATION RATE: 16 BRPM | HEART RATE: 83 BPM | BODY MASS INDEX: 31.24 KG/M2 | TEMPERATURE: 97.6 F

## 2025-09-02 DIAGNOSIS — G43.109 OCULAR MIGRAINE: ICD-10-CM

## 2025-09-02 DIAGNOSIS — F41.1 ANXIETY STATE: ICD-10-CM

## 2025-09-02 DIAGNOSIS — E78.5 DYSLIPIDEMIA: ICD-10-CM

## 2025-09-02 DIAGNOSIS — Z00.00 ROUTINE ADULT HEALTH MAINTENANCE: Primary | ICD-10-CM

## 2025-09-02 DIAGNOSIS — Z13.1 DIABETES MELLITUS SCREENING: ICD-10-CM

## 2025-09-02 LAB
ERYTHROCYTE [DISTWIDTH] IN BLOOD BY AUTOMATED COUNT: 13.4 % (ref 10–15)
EST. AVERAGE GLUCOSE BLD GHB EST-MCNC: 117 MG/DL
HBA1C MFR BLD: 5.7 % (ref 0–5.6)
HCT VFR BLD AUTO: 41.4 % (ref 35–47)
HGB BLD-MCNC: 14 G/DL (ref 11.7–15.7)
MCH RBC QN AUTO: 29.2 PG (ref 26.5–33)
MCHC RBC AUTO-ENTMCNC: 33.8 G/DL (ref 31.5–36.5)
MCV RBC AUTO: 86.4 FL (ref 78–100)
PLATELET # BLD AUTO: 238 10E3/UL (ref 150–450)
RBC # BLD AUTO: 4.79 10E6/UL (ref 3.8–5.2)
WBC # BLD AUTO: 6.09 10E3/UL (ref 4–11)

## 2025-09-02 RX ORDER — ALPRAZOLAM 0.25 MG
0.25 TABLET ORAL
Qty: 28 TABLET | Refills: 0 | Status: CANCELLED | OUTPATIENT
Start: 2025-09-02

## 2025-09-02 SDOH — HEALTH STABILITY: PHYSICAL HEALTH: ON AVERAGE, HOW MANY DAYS PER WEEK DO YOU ENGAGE IN MODERATE TO STRENUOUS EXERCISE (LIKE A BRISK WALK)?: 3 DAYS

## 2025-09-02 SDOH — HEALTH STABILITY: PHYSICAL HEALTH: ON AVERAGE, HOW MANY MINUTES DO YOU ENGAGE IN EXERCISE AT THIS LEVEL?: 30 MIN

## 2025-09-03 ENCOUNTER — MYC MEDICAL ADVICE (OUTPATIENT)
Dept: FAMILY MEDICINE | Facility: CLINIC | Age: 65
End: 2025-09-03
Payer: COMMERCIAL

## 2025-09-03 LAB
ANION GAP SERPL CALCULATED.3IONS-SCNC: 11 MMOL/L (ref 7–15)
BUN SERPL-MCNC: 21 MG/DL (ref 8–23)
CALCIUM SERPL-MCNC: 9.8 MG/DL (ref 8.8–10.4)
CHLORIDE SERPL-SCNC: 105 MMOL/L (ref 98–107)
CHOLEST SERPL-MCNC: 312 MG/DL
CREAT SERPL-MCNC: 0.67 MG/DL (ref 0.51–0.95)
EGFRCR SERPLBLD CKD-EPI 2021: >90 ML/MIN/1.73M2
FASTING STATUS PATIENT QL REPORTED: NO
FASTING STATUS PATIENT QL REPORTED: NO
GLUCOSE SERPL-MCNC: 90 MG/DL (ref 70–99)
HCO3 SERPL-SCNC: 23 MMOL/L (ref 22–29)
HDLC SERPL-MCNC: 42 MG/DL
LDLC SERPL CALC-MCNC: ABNORMAL MG/DL
LDLC SERPL DIRECT ASSAY-MCNC: 195 MG/DL
NONHDLC SERPL-MCNC: 270 MG/DL
POTASSIUM SERPL-SCNC: 4.4 MMOL/L (ref 3.4–5.3)
SODIUM SERPL-SCNC: 139 MMOL/L (ref 135–145)
TRIGL SERPL-MCNC: 444 MG/DL